# Patient Record
Sex: FEMALE | Race: BLACK OR AFRICAN AMERICAN | NOT HISPANIC OR LATINO | Employment: FULL TIME | URBAN - METROPOLITAN AREA
[De-identification: names, ages, dates, MRNs, and addresses within clinical notes are randomized per-mention and may not be internally consistent; named-entity substitution may affect disease eponyms.]

---

## 2018-06-20 ENCOUNTER — HOSPITAL ENCOUNTER (OUTPATIENT)
Facility: HOSPITAL | Age: 18
Discharge: HOME | End: 2018-06-20
Attending: OBSTETRICS & GYNECOLOGY | Admitting: OBSTETRICS & GYNECOLOGY
Payer: MEDICAID

## 2018-06-20 VITALS
TEMPERATURE: 98 F | HEART RATE: 90 BPM | DIASTOLIC BLOOD PRESSURE: 72 MMHG | RESPIRATION RATE: 18 BRPM | SYSTOLIC BLOOD PRESSURE: 129 MMHG

## 2018-06-20 DIAGNOSIS — O47.03 THREATENED PRETERM LABOR, THIRD TRIMESTER: ICD-10-CM

## 2018-06-20 DIAGNOSIS — O30.093 TWIN GESTATION, UNABLE TO DETERMINE NUMBER OF PLACENTA AND NUMBER OF AMNIOTIC SACS IN THIRD TRIMESTER: Primary | ICD-10-CM

## 2018-06-20 PROBLEM — Z87.59 HISTORY OF PREGNANCY INDUCED HYPERTENSION: Status: ACTIVE | Noted: 2018-06-20

## 2018-06-20 PROBLEM — O09.33 NO PRENATAL CARE IN CURRENT PREGNANCY IN THIRD TRIMESTER: Status: ACTIVE | Noted: 2018-06-20

## 2018-06-20 LAB
ABO + RH BLD: NORMAL
ALBUMIN SERPL BCP-MCNC: 2.6 G/DL
ALP SERPL-CCNC: 147 U/L
ALT SERPL W/O P-5'-P-CCNC: 5 U/L
AMPHET+METHAMPHET UR QL: NEGATIVE
ANION GAP SERPL CALC-SCNC: 8 MMOL/L
AST SERPL-CCNC: 13 U/L
BARBITURATES UR QL SCN>200 NG/ML: NEGATIVE
BASOPHILS # BLD AUTO: 0.02 K/UL
BASOPHILS NFR BLD: 0.3 %
BENZODIAZ UR QL SCN>200 NG/ML: NEGATIVE
BILIRUB SERPL-MCNC: 0.3 MG/DL
BLD GP AB SCN CELLS X3 SERPL QL: NORMAL
BUN SERPL-MCNC: 4 MG/DL
BZE UR QL SCN: NEGATIVE
C TRACH DNA SPEC QL NAA+PROBE: NOT DETECTED
CALCIUM SERPL-MCNC: 8.8 MG/DL
CANNABINOIDS UR QL SCN: NEGATIVE
CHLORIDE SERPL-SCNC: 104 MMOL/L
CO2 SERPL-SCNC: 23 MMOL/L
CREAT SERPL-MCNC: 0.7 MG/DL
CREAT UR-MCNC: 123.2 MG/DL
CREAT UR-MCNC: 124.6 MG/DL
DIFFERENTIAL METHOD: ABNORMAL
EOSINOPHIL # BLD AUTO: 0.2 K/UL
EOSINOPHIL NFR BLD: 2.4 %
ERYTHROCYTE [DISTWIDTH] IN BLOOD BY AUTOMATED COUNT: 13.1 %
EST. GFR  (AFRICAN AMERICAN): >60 ML/MIN/1.73 M^2
EST. GFR  (NON AFRICAN AMERICAN): >60 ML/MIN/1.73 M^2
ESTIMATED AVG GLUCOSE: 100 MG/DL
GLUCOSE SERPL-MCNC: 94 MG/DL
HBA1C MFR BLD HPLC: 5.1 %
HCT VFR BLD AUTO: 29.3 %
HGB BLD-MCNC: 9.6 G/DL
HGB S BLD QL SOLY: NEGATIVE
HIV1+2 IGG SERPL QL IA.RAPID: NEGATIVE
LYMPHOCYTES # BLD AUTO: 1.6 K/UL
LYMPHOCYTES NFR BLD: 21.8 %
MCH RBC QN AUTO: 25.2 PG
MCHC RBC AUTO-ENTMCNC: 32.8 G/DL
MCV RBC AUTO: 77 FL
METHADONE UR QL SCN>300 NG/ML: NEGATIVE
MONOCYTES # BLD AUTO: 0.6 K/UL
MONOCYTES NFR BLD: 8.2 %
N GONORRHOEA DNA SPEC QL NAA+PROBE: NOT DETECTED
NEUTROPHILS # BLD AUTO: 5 K/UL
NEUTROPHILS NFR BLD: 67.3 %
OPIATES UR QL SCN: NEGATIVE
PCP UR QL SCN>25 NG/ML: NEGATIVE
PLATELET # BLD AUTO: 229 K/UL
PMV BLD AUTO: 9.9 FL
POTASSIUM SERPL-SCNC: 4 MMOL/L
PROT SERPL-MCNC: 6.2 G/DL
PROT UR-MCNC: 20 MG/DL
PROT/CREAT RATIO, UR: 0.16
RBC # BLD AUTO: 3.81 M/UL
RPR SER QL: NORMAL
SODIUM SERPL-SCNC: 135 MMOL/L
TOXICOLOGY INFORMATION: NORMAL
WBC # BLD AUTO: 7.35 K/UL

## 2018-06-20 PROCEDURE — 83036 HEMOGLOBIN GLYCOSYLATED A1C: CPT

## 2018-06-20 PROCEDURE — 85660 RBC SICKLE CELL TEST: CPT

## 2018-06-20 PROCEDURE — 80307 DRUG TEST PRSMV CHEM ANLYZR: CPT

## 2018-06-20 PROCEDURE — 86762 RUBELLA ANTIBODY: CPT

## 2018-06-20 PROCEDURE — 86901 BLOOD TYPING SEROLOGIC RH(D): CPT

## 2018-06-20 PROCEDURE — 86703 HIV-1/HIV-2 1 RESULT ANTBDY: CPT

## 2018-06-20 PROCEDURE — 99211 OFF/OP EST MAY X REQ PHY/QHP: CPT | Mod: 25,TH

## 2018-06-20 PROCEDURE — 84156 ASSAY OF PROTEIN URINE: CPT

## 2018-06-20 PROCEDURE — 86592 SYPHILIS TEST NON-TREP QUAL: CPT

## 2018-06-20 PROCEDURE — 87147 CULTURE TYPE IMMUNOLOGIC: CPT

## 2018-06-20 PROCEDURE — 59025 FETAL NON-STRESS TEST: CPT | Mod: 26,,, | Performed by: ADVANCED PRACTICE MIDWIFE

## 2018-06-20 PROCEDURE — 87340 HEPATITIS B SURFACE AG IA: CPT

## 2018-06-20 PROCEDURE — 87491 CHLMYD TRACH DNA AMP PROBE: CPT

## 2018-06-20 PROCEDURE — 59025 FETAL NON-STRESS TEST: CPT

## 2018-06-20 PROCEDURE — 80053 COMPREHEN METABOLIC PANEL: CPT

## 2018-06-20 PROCEDURE — 87081 CULTURE SCREEN ONLY: CPT

## 2018-06-20 PROCEDURE — 85025 COMPLETE CBC W/AUTO DIFF WBC: CPT

## 2018-06-20 PROCEDURE — 87184 SC STD DISK METHOD PER PLATE: CPT

## 2018-06-20 RX ORDER — SODIUM CHLORIDE, SODIUM LACTATE, POTASSIUM CHLORIDE, CALCIUM CHLORIDE 600; 310; 30; 20 MG/100ML; MG/100ML; MG/100ML; MG/100ML
INJECTION, SOLUTION INTRAVENOUS CONTINUOUS
Status: DISCONTINUED | OUTPATIENT
Start: 2018-06-20 | End: 2018-06-20

## 2018-06-20 RX ORDER — ONDANSETRON 8 MG/1
8 TABLET, ORALLY DISINTEGRATING ORAL EVERY 8 HOURS PRN
Status: DISCONTINUED | OUTPATIENT
Start: 2018-06-20 | End: 2018-06-20 | Stop reason: HOSPADM

## 2018-06-20 RX ORDER — ACETAMINOPHEN 500 MG
500 TABLET ORAL EVERY 6 HOURS PRN
Status: DISCONTINUED | OUTPATIENT
Start: 2018-06-20 | End: 2018-06-20 | Stop reason: HOSPADM

## 2018-06-20 NOTE — HPI
No prenatal care, twin pregnancy  Reports contractions for the last 1 day, currently reporting every 5-10 minutes.   Good fetal movement

## 2018-06-20 NOTE — DISCHARGE SUMMARY
[facility] -   Obstetrics  Discharge Summary      Patient Name: Kumar Mccoy  MRN: [id number]  Admission Date: 2018  Hospital Length of Stay: 0 days  Discharge Date and Time:  2018 4:08 PM  Attending Physician: Len Rivas MD   Discharging Provider: Johanna Carcamo CNM  Primary Care Provider: Primary Doctor No    HPI: No prenatal care, twin pregnancy  Reports contractions for the last 1 day, currently reporting every 5-10 minutes.   Good fetal movement    * No surgery found *     Hospital Course:   Admit for observation, new OB labs, US, G/C, GBS collected, UA, UDS, PCR        Final Active Diagnoses:    Diagnosis Date Noted POA    History of pregnancy induced hypertension [Z87.59] 2018 Not Applicable    Twin gestation, unable to determine number of placenta and number of amniotic sacs in third trimester [O30.093] 2018 Unknown    No prenatal care in current pregnancy in third trimester [O09.33] 2018 Not Applicable      Problems Resolved During this Admission:    Diagnosis Date Noted Date Resolved POA    PRINCIPAL PROBLEM:  Threatened  labor, third trimester [O47.03] 2018 Yes        Labs: All labs within the past 24 hours have been reviewed      Immunizations     None          This patient has no babies on file.  Pending Diagnostic Studies:     Procedure Component Value Units Date/Time    Hemoglobin A1c [[id number]] Collected:  18    Order Status:  Sent Lab Status:  In process Updated:  18    Specimen:  Blood from Blood     Hepatitis B Surface Antigen [[id number]] Collected:  18    Order Status:  Sent Lab Status:  In process Updated:  18    Specimen:  Blood from Blood     Rubella antibody, IgG [[id number]] Collected:  18    Order Status:  Sent Lab Status:  In process Updated:  18    Specimen:  Blood from Blood     US OB More Than 14 Wks Add Gestation [[id number]] Updated:  18  1256    Order Status:  Sent Lab Status:  In process           Discharged Condition: good    Disposition: Home or Self Care    Follow Up:  Follow-up Information     Leigh Geronimo MD On 6/28/2018.    Specialties:  Obstetrics, Obstetrics and Gynecology  Why:  10:15 for US, 11:30 for appt with Dr. Geronimo  Contact information:  [address]  [place] [address]  [phone]                 Patient Instructions:     Notify your health care provider if you experience any of the following:  temperature >100.4     Notify your health care provider if you experience any of the following:  persistent nausea and vomiting or diarrhea     Notify your health care provider if you experience any of the following:  severe uncontrolled pain     Notify your health care provider if you experience any of the following:  redness, tenderness, or signs of infection (pain, swelling, redness, odor or green/yellow discharge around incision site)     Notify your health care provider if you experience any of the following:  difficulty breathing or increased cough       Medications:  There are no discharge medications for this patient.      Johanna Carcamo CNM  Obstetrics  [facility]

## 2018-06-20 NOTE — H&P
[facility] -   Obstetrics  History & Physical    Patient Name: Kumar Mccoy  MRN: [id number]  Admission Date: 2018  Primary Care Provider: Primary Doctor No    Subjective:     Principal Problem:Threatened  labor, third trimester    History of Present Illness:  No prenatal care, twin pregnancy  Reports contractions for the last 1 day, currently reporting every 5-10 minutes.   Good fetal movement    Obstetric HPI:  Patient reports Date/time of onset:  and Frequency: Every 5-10 minutes contractions, active fetal movement, No vaginal bleeding , No loss of fluid     This pregnancy has been complicated by no prenatal care, twin pregnancy, PIH  Induction with last baby.     Obstetric History       T1      L1     SAB0   TAB0   Ectopic0   Multiple0   Live Births1       # Outcome Date GA Lbr Peyman/2nd Weight Sex Delivery Anes PTL Lv   2 Current            1 Term 16 39w5d  3.005 kg (6 lb 10 oz) M Vag-Spont EPI N HARSHAL      Complications: PIH (pregnancy induced hypertension)        History reviewed. No pertinent past medical history.  History reviewed. No pertinent surgical history.    No prescriptions prior to admission.       Review of patient's allergies indicates:  No Known Allergies     Family History     None        Social History Main Topics    Smoking status: Never Smoker    Smokeless tobacco: Never Used    Alcohol use No    Drug use: Unknown    Sexual activity: Yes     Review of Systems   Constitutional: Negative.    Eyes: Negative.    Cardiovascular: Negative.    Gastrointestinal: Positive for abdominal pain.   Genitourinary: Negative.    Musculoskeletal: Negative.    Skin:  Negative.   Neurological: Positive for headaches.   Psychiatric/Behavioral: Negative.       Objective:     Vital Signs (Most Recent):    Vital Signs (24h Range):           There is no height or weight on file to calculate BMI.    FHT: 150 Cat 1 (reassuring)  FHT: 150 Cat 1 (reassuring)  TOCO:  Reports every 5-10 minutes, appears to be irritability on toco.    Physical Exam:   Constitutional: She is oriented to person, place, and time. She appears well-developed and well-nourished. No distress.    HENT:   Head: Normocephalic and atraumatic.      Cardiovascular: Normal rate, regular rhythm and normal heart sounds.     Pulmonary/Chest: Effort normal and breath sounds normal.        Abdominal: Soft. Bowel sounds are normal.     Genitourinary: Vagina normal.           Musculoskeletal: Normal range of motion and moves all extremeties.       Neurological: She is alert and oriented to person, place, and time.    Skin: Skin is warm and dry.    Psychiatric: She has a normal mood and affect. Her behavior is normal. Judgment and thought content normal.       Cervix:  Dilation:  1  Effacement:  60  Station: -3  Presentation: Vertex     Significant Labs:  No results found for: GROUPTRH, HEPBSAG, RUBELLAIGGSC, STREPBCULT, AFP, [id number]    I have personallly reviewed all pertinent lab results from the last 24 hours.    Assessment/Plan:     18 y.o. female  at 33w3d for:    * Threatened  labor, third trimester    NST, SVE        No prenatal care in current pregnancy in third trimester    New OB labs, US        History of pregnancy induced hypertension    Baseline PreE labs today            Johanna Carcamo CNM  Obstetrics  [facility]

## 2018-06-20 NOTE — PROCEDURES
Kumar Mccoy is a 18 y.o. female patient.    Temp: 98.1 °F (36.7 °C) (06/20/18 1500)  Pulse: 90 (06/20/18 1418)  Resp: 18 (06/20/18 1500)  BP: 129/72 (06/20/18 1418)       Obtain Fetal nonstress test (NST)  Date/Time: 6/20/2018 4:08 PM  Performed by: JOHANNA GARCIA  Authorized by: JOHANNA GARCIA     Nonstress Test:     Variability:  6-25 BPM    Decelerations:  None    Accelerations:  15 bpm    Baseline:  150    Uterine Irritability: No      Contractions:  Not present    Contraction Frequency:  None  Biophysical Profile:     Nonstress Test Interpretation: reactive      Overall Impression:  Reassuring  Post-procedure:     Patient tolerance:  Patient tolerated the procedure well with no immediate complications        Johanna Garcia  6/20/2018

## 2018-06-20 NOTE — SUBJECTIVE & OBJECTIVE
Obstetric HPI:  Patient reports Date/time of onset:  and Frequency: Every 5-10 minutes contractions, active fetal movement, No vaginal bleeding , No loss of fluid     This pregnancy has been complicated by no prenatal care, twin pregnancy, PIH  Induction with last baby.     Obstetric History       T1      L1     SAB0   TAB0   Ectopic0   Multiple0   Live Births1       # Outcome Date GA Lbr Peyman/2nd Weight Sex Delivery Anes PTL Lv   2 Current            1 Term 16 39w5d  3.005 kg (6 lb 10 oz) M Vag-Spont EPI N HARSHAL      Complications: PIH (pregnancy induced hypertension)        History reviewed. No pertinent past medical history.  History reviewed. No pertinent surgical history.    No prescriptions prior to admission.       Review of patient's allergies indicates:  No Known Allergies     Family History     None        Social History Main Topics    Smoking status: Never Smoker    Smokeless tobacco: Never Used    Alcohol use No    Drug use: Unknown    Sexual activity: Yes     Review of Systems   Constitutional: Negative.    Eyes: Negative.    Cardiovascular: Negative.    Gastrointestinal: Positive for abdominal pain.   Genitourinary: Negative.    Musculoskeletal: Negative.    Skin:  Negative.   Neurological: Positive for headaches.   Psychiatric/Behavioral: Negative.       Objective:     Vital Signs (Most Recent):    Vital Signs (24h Range):           There is no height or weight on file to calculate BMI.    FHT: 150 Cat 1 (reassuring)  FHT: 150 Cat 1 (reassuring)  TOCO: Reports every 5-10 minutes, appears to be irritability on toco.    Physical Exam:   Constitutional: She is oriented to person, place, and time. She appears well-developed and well-nourished. No distress.    HENT:   Head: Normocephalic and atraumatic.      Cardiovascular: Normal rate, regular rhythm and normal heart sounds.     Pulmonary/Chest: Effort normal and breath sounds normal.        Abdominal: Soft. Bowel sounds are  normal.     Genitourinary: Vagina normal.           Musculoskeletal: Normal range of motion and moves all extremeties.       Neurological: She is alert and oriented to person, place, and time.    Skin: Skin is warm and dry.    Psychiatric: She has a normal mood and affect. Her behavior is normal. Judgment and thought content normal.       Cervix:  Dilation:  1  Effacement:  60  Station: -3  Presentation: Vertex     Significant Labs:  No results found for: GROUPTRH, HEPBSAG, RUBELLAIGGSC, STREPBCULT, AFP, [id number]    I have personallly reviewed all pertinent lab results from the last 24 hours.

## 2018-06-20 NOTE — DISCHARGE INSTRUCTIONS

## 2018-06-21 LAB
HBV SURFACE AG SERPL QL IA: NEGATIVE
RUBV IGG SER-ACNC: 10.5 IU/ML
RUBV IGG SER-IMP: REACTIVE

## 2018-06-25 LAB — BACTERIA SPEC AEROBE CULT: NORMAL

## 2018-06-28 ENCOUNTER — PROCEDURE VISIT (OUTPATIENT)
Dept: OBSTETRICS AND GYNECOLOGY | Facility: CLINIC | Age: 18
End: 2018-06-28
Payer: MEDICAID

## 2018-06-28 ENCOUNTER — ROUTINE PRENATAL (OUTPATIENT)
Dept: OBSTETRICS AND GYNECOLOGY | Facility: CLINIC | Age: 18
End: 2018-06-28
Payer: MEDICAID

## 2018-06-28 VITALS — SYSTOLIC BLOOD PRESSURE: 122 MMHG | DIASTOLIC BLOOD PRESSURE: 62 MMHG | WEIGHT: 213.19 LBS

## 2018-06-28 DIAGNOSIS — O30.093 TWIN GESTATION, UNABLE TO DETERMINE NUMBER OF PLACENTA AND NUMBER OF AMNIOTIC SACS IN THIRD TRIMESTER: ICD-10-CM

## 2018-06-28 DIAGNOSIS — O30.033 MONOCHORIONIC DIAMNIOTIC TWIN PREGNANCY, THIRD TRIMESTER: Primary | ICD-10-CM

## 2018-06-28 DIAGNOSIS — O09.30 INSUFFICIENT PRENATAL CARE, UNSPECIFIED TRIMESTER: ICD-10-CM

## 2018-06-28 DIAGNOSIS — Z3A.34 PREGNANCY WITH 34 COMPLETED WEEKS GESTATION: ICD-10-CM

## 2018-06-28 PROCEDURE — 76805 OB US >/= 14 WKS SNGL FETUS: CPT | Mod: 59,PBBFAC,PO | Performed by: OBSTETRICS & GYNECOLOGY

## 2018-06-28 PROCEDURE — 76810 OB US >/= 14 WKS ADDL FETUS: CPT | Mod: 26,59,S$PBB, | Performed by: OBSTETRICS & GYNECOLOGY

## 2018-06-28 PROCEDURE — 76810 OB US >/= 14 WKS ADDL FETUS: CPT | Mod: 59,PBBFAC,PO | Performed by: OBSTETRICS & GYNECOLOGY

## 2018-06-28 PROCEDURE — 76805 OB US >/= 14 WKS SNGL FETUS: CPT | Mod: 26,59,S$PBB, | Performed by: OBSTETRICS & GYNECOLOGY

## 2018-06-28 PROCEDURE — 99212 OFFICE O/P EST SF 10 MIN: CPT | Mod: TH,S$PBB,, | Performed by: OBSTETRICS & GYNECOLOGY

## 2018-06-28 PROCEDURE — 99212 OFFICE O/P EST SF 10 MIN: CPT | Mod: PBBFAC,TH,PO,25 | Performed by: OBSTETRICS & GYNECOLOGY

## 2018-06-28 PROCEDURE — 99999 PR PBB SHADOW E&M-EST. PATIENT-LVL II: CPT | Mod: PBBFAC,,, | Performed by: OBSTETRICS & GYNECOLOGY

## 2018-06-28 NOTE — PROGRESS NOTES
Ultrasound: vertex/breech, 1.7% discordance, most of anatomy normal, some subopt views due to late gestation. Will plan for delivery 37-37w6d if no complications. Discussed option for  trial vs scheduled cs

## 2018-07-10 ENCOUNTER — TELEPHONE (OUTPATIENT)
Dept: OBSTETRICS AND GYNECOLOGY | Facility: CLINIC | Age: 18
End: 2018-07-10

## 2018-07-10 ENCOUNTER — HOSPITAL ENCOUNTER (OUTPATIENT)
Facility: HOSPITAL | Age: 18
Discharge: HOME | End: 2018-07-10
Attending: OBSTETRICS & GYNECOLOGY | Admitting: OBSTETRICS & GYNECOLOGY
Payer: MEDICAID

## 2018-07-10 VITALS
RESPIRATION RATE: 18 BRPM | HEART RATE: 94 BPM | TEMPERATURE: 99 F | DIASTOLIC BLOOD PRESSURE: 72 MMHG | SYSTOLIC BLOOD PRESSURE: 125 MMHG

## 2018-07-10 DIAGNOSIS — O47.03 THREATENED PREMATURE LABOR IN THIRD TRIMESTER: ICD-10-CM

## 2018-07-10 DIAGNOSIS — O47.03 THREATENED PRETERM LABOR, THIRD TRIMESTER: ICD-10-CM

## 2018-07-10 PROCEDURE — 96374 THER/PROPH/DIAG INJ IV PUSH: CPT

## 2018-07-10 PROCEDURE — 99211 OFF/OP EST MAY X REQ PHY/QHP: CPT | Mod: 25,TH

## 2018-07-10 PROCEDURE — 96360 HYDRATION IV INFUSION INIT: CPT

## 2018-07-10 PROCEDURE — 63600175 PHARM REV CODE 636 W HCPCS: Performed by: ADVANCED PRACTICE MIDWIFE

## 2018-07-10 PROCEDURE — 25000003 PHARM REV CODE 250: Performed by: ADVANCED PRACTICE MIDWIFE

## 2018-07-10 PROCEDURE — 99213 OFFICE O/P EST LOW 20 MIN: CPT | Mod: TH,25,, | Performed by: ADVANCED PRACTICE MIDWIFE

## 2018-07-10 PROCEDURE — 96361 HYDRATE IV INFUSION ADD-ON: CPT

## 2018-07-10 PROCEDURE — 59025 FETAL NON-STRESS TEST: CPT | Mod: 26,,, | Performed by: ADVANCED PRACTICE MIDWIFE

## 2018-07-10 PROCEDURE — 59025 FETAL NON-STRESS TEST: CPT

## 2018-07-10 RX ORDER — ACETAMINOPHEN 325 MG/1
650 TABLET ORAL EVERY 6 HOURS PRN
Status: DISCONTINUED | OUTPATIENT
Start: 2018-07-10 | End: 2018-07-10 | Stop reason: HOSPADM

## 2018-07-10 RX ORDER — BUTORPHANOL TARTRATE 1 MG/ML
1 INJECTION INTRAMUSCULAR; INTRAVENOUS ONCE
Status: COMPLETED | OUTPATIENT
Start: 2018-07-10 | End: 2018-07-10

## 2018-07-10 RX ORDER — ACETAMINOPHEN 500 MG
500 TABLET ORAL EVERY 6 HOURS PRN
Status: DISCONTINUED | OUTPATIENT
Start: 2018-07-10 | End: 2018-07-10

## 2018-07-10 RX ORDER — ONDANSETRON 8 MG/1
8 TABLET, ORALLY DISINTEGRATING ORAL EVERY 8 HOURS PRN
Status: DISCONTINUED | OUTPATIENT
Start: 2018-07-10 | End: 2018-07-10 | Stop reason: HOSPADM

## 2018-07-10 RX ADMIN — BUTORPHANOL TARTRATE 1 MG: 1 INJECTION, SOLUTION INTRAMUSCULAR; INTRAVENOUS at 02:07

## 2018-07-10 RX ADMIN — SODIUM CHLORIDE, SODIUM LACTATE, POTASSIUM CHLORIDE, AND CALCIUM CHLORIDE 1000 ML: .6; .31; .03; .02 INJECTION, SOLUTION INTRAVENOUS at 02:07

## 2018-07-10 NOTE — DISCHARGE SUMMARY
"[facility] -   Obstetrics  Discharge Summary      Patient Name: Kumar Mccoy  MRN: [id number]  Admission Date: 7/10/2018  Hospital Length of Stay: 0 days  Discharge Date and Time:  07/10/2018 4:25 PM  Attending Physician: Torie Carcamo MD   Discharging Provider: Johanna Carcamo CNM  Primary Care Provider: Primary Doctor No    HPI: Reports contractions X 2 days, pain 9/10, good fetal movement, no leaking or bleeding    * No surgery found *     Hospital Course:   Pt still having contractions, but "not as bad as before"  No cervical change, reactive NST.   Lives 5 minutes away, will come back if contractions get worse.        Final Active Diagnoses:    Diagnosis Date Noted POA    PRINCIPAL PROBLEM:  Threatened premature labor in third trimester [O47.03] 07/10/2018 Unknown      Problems Resolved During this Admission:    Diagnosis Date Noted Date Resolved POA    Threatened  labor, third trimester [O47.03] 07/10/2018 07/10/2018 Yes        Immunizations     None          This patient has no babies on file.  Pending Diagnostic Studies:     None          Discharged Condition: good    Disposition: Home or Self Care    Follow Up:  Follow-up Information     Follow up On 2018.               Patient Instructions:     Notify your health care provider if you experience any of the following:  temperature >100.4     Notify your health care provider if you experience any of the following:  persistent nausea and vomiting or diarrhea     Notify your health care provider if you experience any of the following:  severe uncontrolled pain     Notify your health care provider if you experience any of the following:  redness, tenderness, or signs of infection (pain, swelling, redness, odor or green/yellow discharge around incision site)     Notify your health care provider if you experience any of the following:  difficulty breathing or increased cough     Notify your health care provider if you experience any of " the following:  severe persistent headache       Medications:  Current Discharge Medication List      STOP taking these medications       diphenhydramine-acetaminophen (TYLENOL PM)  mg Tab Comments:   Reason for Stopping:               Johanna Carcamo CNM  Obstetrics  [facility]

## 2018-07-10 NOTE — HOSPITAL COURSE
"Pt still having contractions, but "not as bad as before"  No cervical change, reactive NST.   Lives 5 minutes away, will come back if contractions get worse.  "

## 2018-07-10 NOTE — PROCEDURES
Kumar Mccoy is a 18 y.o. female patient.    Temp: 99.2 °F (37.3 °C) (07/10/18 1245)  Pulse: 94 (07/10/18 1245)  Resp: 18 (07/10/18 1245)  BP: 125/72 (07/10/18 1245)       Fetal non-stress test  Date/Time: 7/10/2018 5:07 PM  Performed by: JOHANNA GARCIA  Authorized by: JOHANNA GARCIA     Nonstress Test:     Variability:  6-25 BPM    Decelerations:  None    Accelerations:  15 bpm    Baseline:  135    Uterine Irritability: Yes      Contractions:  Regular    Contraction Frequency:  2-4  Biophysical Profile:     Nonstress Test Interpretation: reactive      Overall Impression:  Reassuring  Post-procedure:     Patient tolerance:  Patient tolerated the procedure well with no immediate complications        Johanna Garcia  7/10/2018

## 2018-07-10 NOTE — SUBJECTIVE & OBJECTIVE
Obstetric HPI:  Patient reports Date/time of onset: 2018, Frequency: Every 2-3 minutes, , active fetal movement, No vaginal bleeding , No loss of fluid     This pregnancy has been complicated by limited prenatal care (1 visit) and mono di twins    Obstetric History       T1      L1     SAB0   TAB0   Ectopic0   Multiple0   Live Births1       # Outcome Date GA Lbr Peyman/2nd Weight Sex Delivery Anes PTL Lv   2 Current            1 Term 16 39w5d  3.005 kg (6 lb 10 oz) M Vag-Spont EPI N HARSHAL      Complications: PIH (pregnancy induced hypertension)        Past Medical History:   Diagnosis Date    Anemia     Hypertension      Past Surgical History:   Procedure Laterality Date    I&D LEFT ARM      TONSILLECTOMY, ADENOIDECTOMY, BILATERAL MYRINGOTOMY AND TUBES         PTA Medications   Medication Sig    [DISCONTINUED] diphenhydramine-acetaminophen (TYLENOL PM)  mg Tab Take 1 tablet by mouth nightly as needed.       Review of patient's allergies indicates:  No Known Allergies     Family History     Problem Relation (Age of Onset)    Cancer Maternal Grandmother    Diabetes Maternal Grandfather        Social History Main Topics    Smoking status: Never Smoker    Smokeless tobacco: Never Used    Alcohol use No    Drug use: No    Sexual activity: Yes     Partners: Male     Review of Systems   Constitutional: Negative.    Respiratory: Negative.    Cardiovascular: Negative.    Gastrointestinal: Positive for abdominal pain.   Genitourinary: Negative.    Musculoskeletal: Positive for back pain.   Skin:  Negative.   Neurological: Negative.    Psychiatric/Behavioral: Negative.       Objective:     Vital Signs (Most Recent):  Temp: 99.2 °F (37.3 °C) (07/10/18 1245)  Pulse: 94 (07/10/18 1245)  Resp: 18 (07/10/18 1245)  BP: 125/72 (07/10/18 1245) Vital Signs (24h Range):  Temp:  [99.2 °F (37.3 °C)] 99.2 °F (37.3 °C)  Pulse:  [94] 94  Resp:  [18] 18  BP: (125)/(72) 125/72        There is no height or  weight on file to calculate BMI.    FHT: 130 Cat 1 (reassuring)  FHT: 135 Cat 1 (reassuring)  TOCO: Q 2-5 minutes, mild by palpation    Physical Exam    Cervix:  Dilation:  2  Effacement:  50%  Station: -2  Presentation: Vertex     Significant Labs:  Lab Results   Component Value Date    GROUPTRH O POS 06/20/2018    HEPBSAG Negative 06/20/2018    STREPBCULT  06/20/2018     STREPTOCOCCUS AGALACTIAE (GROUP B)  Beta-hemolytic streptococci are routinely susceptible to   penicillins,cephalosporins and carbapenems.         I have personallly reviewed all pertinent lab results from the last 24 hours.

## 2018-07-10 NOTE — PROCEDURES
Kumar Mccoy is a 18 y.o. female patient.    Temp: 99.2 °F (37.3 °C) (07/10/18 1245)  Pulse: 94 (07/10/18 1245)  Resp: 18 (07/10/18 1245)  BP: 125/72 (07/10/18 1245)       Procedures    Johanna Carcamo  7/10/2018

## 2018-07-10 NOTE — PROCEDURES
Kumar Mccoy is a 18 y.o. female patient.    Temp: 99.2 °F (37.3 °C) (07/10/18 1245)  Pulse: 94 (07/10/18 1245)  Resp: 18 (07/10/18 1245)  BP: 125/72 (07/10/18 1245)       Fetal non-stress test  Date/Time: 7/10/2018 4:18 PM  Performed by: JOHANNA GARCIA  Authorized by: JOHANNA GARCIA     Nonstress Test:     Variability:  6-25 BPM    Decelerations:  None    Accelerations:  15 bpm    Baseline:  135    Uterine Irritability: Yes      Contractions:  Regular    Contraction Frequency:  3-5  Biophysical Profile:     Nonstress Test Interpretation: reactive      Overall Impression:  Reassuring  Post-procedure:     Patient tolerance:  Patient tolerated the procedure well with no immediate complications          Johanna Garcia  7/10/2018

## 2018-07-10 NOTE — TELEPHONE ENCOUNTER
Patient called stated she hasn't been able to move or get out of the bed in 2 days and she is in severe pain. Advised the patient to go to [facility] on [address] for evaluation. Patient states she is on her way and should be there in about 5 minutes.

## 2018-07-10 NOTE — DISCHARGE INSTRUCTIONS

## 2018-07-10 NOTE — H&P
[facility] -   Obstetrics  History & Physical    Patient Name: Kumar Mccoy  MRN: [id number]  Admission Date: 7/10/2018  Primary Care Provider: Primary Doctor No    Subjective:     Principal Problem:Threatened premature labor in third trimester    History of Present Illness:  Reports contractions X 2 days, pain 9/10, good fetal movement, no leaking or bleeding    Obstetric HPI:  Patient reports Date/time of onset: 2018, Frequency: Every 2-3 minutes, , active fetal movement, No vaginal bleeding , No loss of fluid     This pregnancy has been complicated by limited prenatal care (1 visit) and mono di twins    Obstetric History       T1      L1     SAB0   TAB0   Ectopic0   Multiple0   Live Births1       # Outcome Date GA Lbr Peyman/2nd Weight Sex Delivery Anes PTL Lv   2 Current            1 Term 16 39w5d  3.005 kg (6 lb 10 oz) M Vag-Spont EPI N HARSHAL      Complications: PIH (pregnancy induced hypertension)        Past Medical History:   Diagnosis Date    Anemia     Hypertension      Past Surgical History:   Procedure Laterality Date    I&D LEFT ARM      TONSILLECTOMY, ADENOIDECTOMY, BILATERAL MYRINGOTOMY AND TUBES         PTA Medications   Medication Sig    [DISCONTINUED] diphenhydramine-acetaminophen (TYLENOL PM)  mg Tab Take 1 tablet by mouth nightly as needed.       Review of patient's allergies indicates:  No Known Allergies     Family History     Problem Relation (Age of Onset)    Cancer Maternal Grandmother    Diabetes Maternal Grandfather        Social History Main Topics    Smoking status: Never Smoker    Smokeless tobacco: Never Used    Alcohol use No    Drug use: No    Sexual activity: Yes     Partners: Male     Review of Systems   Constitutional: Negative.    Respiratory: Negative.    Cardiovascular: Negative.    Gastrointestinal: Positive for abdominal pain.   Genitourinary: Negative.    Musculoskeletal: Positive for back pain.   Skin:  Negative.    Neurological: Negative.    Psychiatric/Behavioral: Negative.       Objective:     Vital Signs (Most Recent):  Temp: 99.2 °F (37.3 °C) (07/10/18 1245)  Pulse: 94 (07/10/18 1245)  Resp: 18 (07/10/18 1245)  BP: 125/72 (07/10/18 1245) Vital Signs (24h Range):  Temp:  [99.2 °F (37.3 °C)] 99.2 °F (37.3 °C)  Pulse:  [94] 94  Resp:  [18] 18  BP: (125)/(72) 125/72        There is no height or weight on file to calculate BMI.    FHT: 130 Cat 1 (reassuring)  FHT: 135 Cat 1 (reassuring)  TOCO: Q 2-5 minutes, mild by palpation    Physical Exam    Cervix:  Dilation:  2  Effacement:  50%  Station: -2  Presentation: Vertex     Significant Labs:  Lab Results   Component Value Date    GROUPTRH O POS 2018    HEPBSAG Negative 2018    STREPBCULT  2018     STREPTOCOCCUS AGALACTIAE (GROUP B)  Beta-hemolytic streptococci are routinely susceptible to   penicillins,cephalosporins and carbapenems.         I have personallly reviewed all pertinent lab results from the last 24 hours.    Assessment/Plan:     18 y.o. female  at 36w2d for:    * Threatened premature labor in third trimester    Observation, NST  SVE, IV hydration, pain meds            Johanna Carcamo CNM  Obstetrics  [facility]

## 2018-07-12 ENCOUNTER — ROUTINE PRENATAL (OUTPATIENT)
Dept: OBSTETRICS AND GYNECOLOGY | Facility: CLINIC | Age: 18
End: 2018-07-12
Payer: MEDICAID

## 2018-07-12 VITALS — SYSTOLIC BLOOD PRESSURE: 126 MMHG | WEIGHT: 215.63 LBS | DIASTOLIC BLOOD PRESSURE: 74 MMHG

## 2018-07-12 DIAGNOSIS — O30.033 MONOCHORIONIC DIAMNIOTIC TWIN GESTATION IN THIRD TRIMESTER: ICD-10-CM

## 2018-07-12 DIAGNOSIS — Z3A.36 PREGNANCY WITH 36 COMPLETED WEEKS GESTATION: Primary | ICD-10-CM

## 2018-07-12 DIAGNOSIS — O09.30 INSUFFICIENT PRENATAL CARE, UNSPECIFIED TRIMESTER: ICD-10-CM

## 2018-07-12 PROCEDURE — 99212 OFFICE O/P EST SF 10 MIN: CPT | Mod: PBBFAC,TH,PO | Performed by: OBSTETRICS & GYNECOLOGY

## 2018-07-12 PROCEDURE — 99999 PR PBB SHADOW E&M-EST. PATIENT-LVL II: CPT | Mod: PBBFAC,,, | Performed by: OBSTETRICS & GYNECOLOGY

## 2018-07-12 PROCEDURE — 99212 OFFICE O/P EST SF 10 MIN: CPT | Mod: TH,S$PBB,, | Performed by: OBSTETRICS & GYNECOLOGY

## 2018-07-15 NOTE — PROGRESS NOTES
Pt seen in L&D, cervix 2cm but no active labor. C/o constant pelvic pressure & pain. Cervix with minimal cervical changes, vertex, engaged. Labor precautions. Previous , candidate for  of twins-vertex/breech last scan. RTC 2 wks, u/s growth twins in 1 wk

## 2018-07-19 ENCOUNTER — ANESTHESIA (OUTPATIENT)
Dept: OBSTETRICS AND GYNECOLOGY | Facility: HOSPITAL | Age: 18
End: 2018-07-19
Payer: MEDICAID

## 2018-07-19 ENCOUNTER — ANESTHESIA EVENT (OUTPATIENT)
Dept: OBSTETRICS AND GYNECOLOGY | Facility: HOSPITAL | Age: 18
End: 2018-07-19
Payer: MEDICAID

## 2018-07-19 ENCOUNTER — HOSPITAL ENCOUNTER (INPATIENT)
Facility: HOSPITAL | Age: 18
LOS: 3 days | Discharge: HOME | End: 2018-07-22
Attending: OBSTETRICS & GYNECOLOGY | Admitting: OBSTETRICS & GYNECOLOGY
Payer: MEDICAID

## 2018-07-19 ENCOUNTER — ROUTINE PRENATAL (OUTPATIENT)
Dept: OBSTETRICS AND GYNECOLOGY | Facility: CLINIC | Age: 18
End: 2018-07-19
Payer: MEDICAID

## 2018-07-19 ENCOUNTER — SURGERY (OUTPATIENT)
Age: 18
End: 2018-07-19

## 2018-07-19 ENCOUNTER — PROCEDURE VISIT (OUTPATIENT)
Dept: OBSTETRICS AND GYNECOLOGY | Facility: CLINIC | Age: 18
End: 2018-07-19
Payer: MEDICAID

## 2018-07-19 VITALS — DIASTOLIC BLOOD PRESSURE: 82 MMHG | WEIGHT: 219.38 LBS | SYSTOLIC BLOOD PRESSURE: 132 MMHG

## 2018-07-19 DIAGNOSIS — O36.5932 POOR FETAL GROWTH AFFECTING MANAGEMENT OF MOTHER IN THIRD TRIMESTER, FETUS 2 OF MULTIPLE GESTATION: ICD-10-CM

## 2018-07-19 DIAGNOSIS — O30.033 MONOCHORIONIC DIAMNIOTIC TWIN GESTATION IN THIRD TRIMESTER: ICD-10-CM

## 2018-07-19 DIAGNOSIS — O36.5990 IUGR (INTRAUTERINE GROWTH RESTRICTION) AFFECTING CARE OF MOTHER: ICD-10-CM

## 2018-07-19 DIAGNOSIS — Z98.891 S/P C-SECTION: ICD-10-CM

## 2018-07-19 DIAGNOSIS — O30.093 TWIN GESTATION, UNABLE TO DETERMINE NUMBER OF PLACENTA AND NUMBER OF AMNIOTIC SACS IN THIRD TRIMESTER: Primary | ICD-10-CM

## 2018-07-19 DIAGNOSIS — Z98.891 S/P PRIMARY LOW TRANSVERSE C-SECTION: Primary | ICD-10-CM

## 2018-07-19 PROBLEM — O36.5931 IUGR (INTRAUTERINE GROWTH RESTRICTION) AFFECTING CARE OF MOTHER, THIRD TRIMESTER, FETUS 1: Status: RESOLVED | Noted: 2018-07-19 | Resolved: 2018-07-19

## 2018-07-19 PROBLEM — O36.5931 IUGR (INTRAUTERINE GROWTH RESTRICTION) AFFECTING CARE OF MOTHER, THIRD TRIMESTER, FETUS 1: Status: ACTIVE | Noted: 2018-07-19

## 2018-07-19 PROBLEM — O99.820 GBS (GROUP B STREPTOCOCCUS CARRIER), +RV CULTURE, CURRENTLY PREGNANT: Status: ACTIVE | Noted: 2018-07-19

## 2018-07-19 PROBLEM — O36.5911 IUGR (INTRAUTERINE GROWTH RESTRICTION) AFFECTING CARE OF MOTHER, FIRST TRIMESTER, FETUS 1: Status: ACTIVE | Noted: 2018-07-19

## 2018-07-19 LAB
ABO + RH BLD: NORMAL
ANISOCYTOSIS BLD QL SMEAR: SLIGHT
BASOPHILS # BLD AUTO: 0.03 K/UL
BASOPHILS NFR BLD: 0.5 %
BLD GP AB SCN CELLS X3 SERPL QL: NORMAL
DACRYOCYTES BLD QL SMEAR: ABNORMAL
DIFFERENTIAL METHOD: ABNORMAL
EOSINOPHIL # BLD AUTO: 0.3 K/UL
EOSINOPHIL NFR BLD: 3.8 %
ERYTHROCYTE [DISTWIDTH] IN BLOOD BY AUTOMATED COUNT: 13.4 %
GIANT PLATELETS BLD QL SMEAR: PRESENT
HCT VFR BLD AUTO: 27.9 %
HGB BLD-MCNC: 9.2 G/DL
HIV1+2 IGG SERPL QL IA.RAPID: NEGATIVE
HYPOCHROMIA BLD QL SMEAR: ABNORMAL
LYMPHOCYTES # BLD AUTO: 1.5 K/UL
LYMPHOCYTES NFR BLD: 22.9 %
MCH RBC QN AUTO: 24.3 PG
MCHC RBC AUTO-ENTMCNC: 33 G/DL
MCV RBC AUTO: 74 FL
MONOCYTES # BLD AUTO: 0.7 K/UL
MONOCYTES NFR BLD: 11.3 %
NEUTROPHILS # BLD AUTO: 4 K/UL
NEUTROPHILS NFR BLD: 63.2 %
OVALOCYTES BLD QL SMEAR: ABNORMAL
PLATELET # BLD AUTO: 235 K/UL
PLATELET BLD QL SMEAR: ABNORMAL
PMV BLD AUTO: 10 FL
POIKILOCYTOSIS BLD QL SMEAR: SLIGHT
POLYCHROMASIA BLD QL SMEAR: ABNORMAL
RBC # BLD AUTO: 3.79 M/UL
RPR SER QL: NORMAL
SPHEROCYTES BLD QL SMEAR: ABNORMAL
WBC # BLD AUTO: 6.54 K/UL

## 2018-07-19 PROCEDURE — 72100002 HC LABOR CARE, 1ST 8 HOURS

## 2018-07-19 PROCEDURE — 11000001 HC ACUTE MED/SURG PRIVATE ROOM

## 2018-07-19 PROCEDURE — 99213 OFFICE O/P EST LOW 20 MIN: CPT | Mod: TH,S$PBB,, | Performed by: ADVANCED PRACTICE MIDWIFE

## 2018-07-19 PROCEDURE — 86592 SYPHILIS TEST NON-TREP QUAL: CPT

## 2018-07-19 PROCEDURE — 36000686 HC CESAREAN SECTION LEVEL II

## 2018-07-19 PROCEDURE — 76816 OB US FOLLOW-UP PER FETUS: CPT | Mod: 26,S$PBB,, | Performed by: OBSTETRICS & GYNECOLOGY

## 2018-07-19 PROCEDURE — 88307 TISSUE EXAM BY PATHOLOGIST: CPT | Mod: 26,,, | Performed by: PATHOLOGY

## 2018-07-19 PROCEDURE — 76820 UMBILICAL ARTERY ECHO: CPT | Mod: 26,S$PBB,, | Performed by: OBSTETRICS & GYNECOLOGY

## 2018-07-19 PROCEDURE — 59514 CESAREAN DELIVERY ONLY: CPT | Mod: 22,AT,, | Performed by: OBSTETRICS & GYNECOLOGY

## 2018-07-19 PROCEDURE — 86920 COMPATIBILITY TEST SPIN: CPT

## 2018-07-19 PROCEDURE — 63600175 PHARM REV CODE 636 W HCPCS: Performed by: NURSE ANESTHETIST, CERTIFIED REGISTERED

## 2018-07-19 PROCEDURE — 76820 UMBILICAL ARTERY ECHO: CPT | Mod: PBBFAC | Performed by: OBSTETRICS & GYNECOLOGY

## 2018-07-19 PROCEDURE — 27200688 HC TRAY, SPINAL-HYPER/ ISOBARIC: Performed by: NURSE ANESTHETIST, CERTIFIED REGISTERED

## 2018-07-19 PROCEDURE — 99212 OFFICE O/P EST SF 10 MIN: CPT | Mod: PBBFAC,TH | Performed by: ADVANCED PRACTICE MIDWIFE

## 2018-07-19 PROCEDURE — 76819 FETAL BIOPHYS PROFIL W/O NST: CPT | Mod: PBBFAC | Performed by: OBSTETRICS & GYNECOLOGY

## 2018-07-19 PROCEDURE — 25000003 PHARM REV CODE 250: Performed by: ADVANCED PRACTICE MIDWIFE

## 2018-07-19 PROCEDURE — 25000003 PHARM REV CODE 250: Performed by: OBSTETRICS & GYNECOLOGY

## 2018-07-19 PROCEDURE — 85025 COMPLETE CBC W/AUTO DIFF WBC: CPT

## 2018-07-19 PROCEDURE — 63600175 PHARM REV CODE 636 W HCPCS: Performed by: ADVANCED PRACTICE MIDWIFE

## 2018-07-19 PROCEDURE — 86762 RUBELLA ANTIBODY: CPT

## 2018-07-19 PROCEDURE — 88307 TISSUE EXAM BY PATHOLOGIST: CPT | Performed by: PATHOLOGY

## 2018-07-19 PROCEDURE — 76819 FETAL BIOPHYS PROFIL W/O NST: CPT | Mod: 26,S$PBB,, | Performed by: OBSTETRICS & GYNECOLOGY

## 2018-07-19 PROCEDURE — 63600175 PHARM REV CODE 636 W HCPCS: Performed by: OBSTETRICS & GYNECOLOGY

## 2018-07-19 PROCEDURE — 37000008 HC ANESTHESIA 1ST 15 MINUTES: Performed by: OBSTETRICS & GYNECOLOGY

## 2018-07-19 PROCEDURE — 76816 OB US FOLLOW-UP PER FETUS: CPT | Mod: PBBFAC | Performed by: OBSTETRICS & GYNECOLOGY

## 2018-07-19 PROCEDURE — 86703 HIV-1/HIV-2 1 RESULT ANTBDY: CPT

## 2018-07-19 PROCEDURE — 37000009 HC ANESTHESIA EA ADD 15 MINS: Performed by: OBSTETRICS & GYNECOLOGY

## 2018-07-19 PROCEDURE — 62322 NJX INTERLAMINAR LMBR/SAC: CPT | Performed by: ANESTHESIOLOGY

## 2018-07-19 PROCEDURE — 25000003 PHARM REV CODE 250: Performed by: NURSE ANESTHETIST, CERTIFIED REGISTERED

## 2018-07-19 PROCEDURE — 86850 RBC ANTIBODY SCREEN: CPT

## 2018-07-19 PROCEDURE — 99999 PR PBB SHADOW E&M-EST. PATIENT-LVL II: CPT | Mod: PBBFAC,,, | Performed by: ADVANCED PRACTICE MIDWIFE

## 2018-07-19 RX ORDER — ONDANSETRON 8 MG/1
8 TABLET, ORALLY DISINTEGRATING ORAL EVERY 8 HOURS PRN
Status: DISCONTINUED | OUTPATIENT
Start: 2018-07-19 | End: 2018-07-22 | Stop reason: HOSPADM

## 2018-07-19 RX ORDER — HYDROMORPHONE HYDROCHLORIDE 1 MG/ML
1 INJECTION, SOLUTION INTRAMUSCULAR; INTRAVENOUS; SUBCUTANEOUS EVERY 4 HOURS PRN
Status: DISCONTINUED | OUTPATIENT
Start: 2018-07-19 | End: 2018-07-22 | Stop reason: HOSPADM

## 2018-07-19 RX ORDER — PHENYLEPHRINE HYDROCHLORIDE 10 MG/ML
INJECTION INTRAVENOUS
Status: DISCONTINUED | OUTPATIENT
Start: 2018-07-19 | End: 2018-07-19

## 2018-07-19 RX ORDER — OXYTOCIN/RINGER'S LACTATE 20/1000 ML
41.65 PLASTIC BAG, INJECTION (ML) INTRAVENOUS CONTINUOUS
Status: DISPENSED | OUTPATIENT
Start: 2018-07-19 | End: 2018-07-20

## 2018-07-19 RX ORDER — CHLORHEXIDINE GLUCONATE ORAL RINSE 1.2 MG/ML
10 SOLUTION DENTAL
Status: DISCONTINUED | OUTPATIENT
Start: 2018-07-19 | End: 2018-07-19

## 2018-07-19 RX ORDER — ONDANSETRON 2 MG/ML
INJECTION INTRAMUSCULAR; INTRAVENOUS
Status: DISCONTINUED | OUTPATIENT
Start: 2018-07-19 | End: 2018-07-19

## 2018-07-19 RX ORDER — OXYTOCIN 10 [USP'U]/ML
INJECTION, SOLUTION INTRAMUSCULAR; INTRAVENOUS
Status: DISCONTINUED | OUTPATIENT
Start: 2018-07-19 | End: 2018-07-19

## 2018-07-19 RX ORDER — FENTANYL CITRATE 50 UG/ML
INJECTION, SOLUTION INTRAMUSCULAR; INTRAVENOUS
Status: DISCONTINUED | OUTPATIENT
Start: 2018-07-19 | End: 2018-07-19

## 2018-07-19 RX ORDER — ACETAMINOPHEN 325 MG/1
650 TABLET ORAL EVERY 6 HOURS PRN
Status: DISCONTINUED | OUTPATIENT
Start: 2018-07-19 | End: 2018-07-22 | Stop reason: HOSPADM

## 2018-07-19 RX ORDER — DOCUSATE SODIUM 100 MG/1
100 CAPSULE, LIQUID FILLED ORAL DAILY
Status: DISCONTINUED | OUTPATIENT
Start: 2018-07-20 | End: 2018-07-22 | Stop reason: HOSPADM

## 2018-07-19 RX ORDER — SODIUM CHLORIDE, SODIUM LACTATE, POTASSIUM CHLORIDE, CALCIUM CHLORIDE 600; 310; 30; 20 MG/100ML; MG/100ML; MG/100ML; MG/100ML
INJECTION, SOLUTION INTRAVENOUS CONTINUOUS
Status: DISCONTINUED | OUTPATIENT
Start: 2018-07-19 | End: 2018-07-19

## 2018-07-19 RX ORDER — SODIUM CITRATE AND CITRIC ACID MONOHYDRATE 334; 500 MG/5ML; MG/5ML
30 SOLUTION ORAL
Status: DISCONTINUED | OUTPATIENT
Start: 2018-07-19 | End: 2018-07-19

## 2018-07-19 RX ORDER — DIPHENHYDRAMINE HCL 25 MG
25 CAPSULE ORAL EVERY 4 HOURS PRN
Status: DISCONTINUED | OUTPATIENT
Start: 2018-07-19 | End: 2018-07-20

## 2018-07-19 RX ORDER — DIPHENHYDRAMINE HYDROCHLORIDE 50 MG/ML
25 INJECTION INTRAMUSCULAR; INTRAVENOUS EVERY 4 HOURS PRN
Status: DISCONTINUED | OUTPATIENT
Start: 2018-07-19 | End: 2018-07-22 | Stop reason: HOSPADM

## 2018-07-19 RX ORDER — CEFAZOLIN SODIUM 1 G/50ML
2 SOLUTION INTRAVENOUS
Status: DISCONTINUED | OUTPATIENT
Start: 2018-07-19 | End: 2018-07-19

## 2018-07-19 RX ORDER — HYDROCORTISONE 25 MG/G
CREAM TOPICAL 3 TIMES DAILY PRN
Status: DISCONTINUED | OUTPATIENT
Start: 2018-07-19 | End: 2018-07-22 | Stop reason: HOSPADM

## 2018-07-19 RX ORDER — MIDAZOLAM HYDROCHLORIDE 1 MG/ML
INJECTION, SOLUTION INTRAMUSCULAR; INTRAVENOUS
Status: DISCONTINUED | OUTPATIENT
Start: 2018-07-19 | End: 2018-07-19

## 2018-07-19 RX ORDER — ADHESIVE BANDAGE
30 BANDAGE TOPICAL DAILY PRN
Status: DISCONTINUED | OUTPATIENT
Start: 2018-07-19 | End: 2018-07-22 | Stop reason: HOSPADM

## 2018-07-19 RX ORDER — KETOROLAC TROMETHAMINE 30 MG/ML
INJECTION, SOLUTION INTRAMUSCULAR; INTRAVENOUS
Status: DISCONTINUED | OUTPATIENT
Start: 2018-07-19 | End: 2018-07-19

## 2018-07-19 RX ORDER — MORPHINE SULFATE 1 MG/ML
INJECTION, SOLUTION EPIDURAL; INTRATHECAL; INTRAVENOUS
Status: DISCONTINUED | OUTPATIENT
Start: 2018-07-19 | End: 2018-07-19

## 2018-07-19 RX ORDER — SIMETHICONE 80 MG
1 TABLET,CHEWABLE ORAL EVERY 6 HOURS PRN
Status: DISCONTINUED | OUTPATIENT
Start: 2018-07-19 | End: 2018-07-22 | Stop reason: HOSPADM

## 2018-07-19 RX ORDER — KETOROLAC TROMETHAMINE 30 MG/ML
30 INJECTION, SOLUTION INTRAMUSCULAR; INTRAVENOUS EVERY 6 HOURS
Status: COMPLETED | OUTPATIENT
Start: 2018-07-20 | End: 2018-07-20

## 2018-07-19 RX ORDER — SODIUM CHLORIDE, SODIUM LACTATE, POTASSIUM CHLORIDE, CALCIUM CHLORIDE 600; 310; 30; 20 MG/100ML; MG/100ML; MG/100ML; MG/100ML
INJECTION, SOLUTION INTRAVENOUS CONTINUOUS PRN
Status: DISCONTINUED | OUTPATIENT
Start: 2018-07-19 | End: 2018-07-19

## 2018-07-19 RX ORDER — IBUPROFEN 800 MG/1
800 TABLET ORAL EVERY 8 HOURS
Status: DISCONTINUED | OUTPATIENT
Start: 2018-07-21 | End: 2018-07-22 | Stop reason: HOSPADM

## 2018-07-19 RX ORDER — BISACODYL 10 MG
10 SUPPOSITORY, RECTAL RECTAL ONCE AS NEEDED
Status: ACTIVE | OUTPATIENT
Start: 2018-07-19 | End: 2018-07-19

## 2018-07-19 RX ORDER — HYDROCODONE BITARTRATE AND ACETAMINOPHEN 10; 325 MG/1; MG/1
1 TABLET ORAL EVERY 4 HOURS PRN
Status: DISCONTINUED | OUTPATIENT
Start: 2018-07-19 | End: 2018-07-21

## 2018-07-19 RX ORDER — HYDROCODONE BITARTRATE AND ACETAMINOPHEN 5; 325 MG/1; MG/1
1 TABLET ORAL EVERY 4 HOURS PRN
Status: DISCONTINUED | OUTPATIENT
Start: 2018-07-19 | End: 2018-07-21

## 2018-07-19 RX ORDER — MISOPROSTOL 200 UG/1
800 TABLET ORAL
Status: DISCONTINUED | OUTPATIENT
Start: 2018-07-19 | End: 2018-07-19

## 2018-07-19 RX ADMIN — MORPHINE SULFATE 200 MCG: 1 INJECTION, SOLUTION EPIDURAL; INTRATHECAL; INTRAVENOUS at 05:07

## 2018-07-19 RX ADMIN — KETOROLAC TROMETHAMINE 30 MG: 30 INJECTION, SOLUTION INTRAMUSCULAR at 05:07

## 2018-07-19 RX ADMIN — HYDROMORPHONE HYDROCHLORIDE 1 MG: 1 INJECTION, SOLUTION INTRAMUSCULAR; INTRAVENOUS; SUBCUTANEOUS at 08:07

## 2018-07-19 RX ADMIN — AZITHROMYCIN MONOHYDRATE 500 MG: 500 INJECTION, POWDER, LYOPHILIZED, FOR SOLUTION INTRAVENOUS at 04:07

## 2018-07-19 RX ADMIN — HYDROCODONE BITARTRATE AND ACETAMINOPHEN 1 TABLET: 10; 325 TABLET ORAL at 07:07

## 2018-07-19 RX ADMIN — PHENYLEPHRINE HYDROCHLORIDE 100 MCG: 10 INJECTION INTRAVENOUS at 05:07

## 2018-07-19 RX ADMIN — SODIUM CHLORIDE, SODIUM LACTATE, POTASSIUM CHLORIDE, AND CALCIUM CHLORIDE 500 ML: .6; .31; .03; .02 INJECTION, SOLUTION INTRAVENOUS at 11:07

## 2018-07-19 RX ADMIN — SODIUM CHLORIDE, SODIUM LACTATE, POTASSIUM CHLORIDE, AND CALCIUM CHLORIDE: .6; .31; .03; .02 INJECTION, SOLUTION INTRAVENOUS at 04:07

## 2018-07-19 RX ADMIN — OXYTOCIN 20 UNITS: 10 INJECTION, SOLUTION INTRAMUSCULAR; INTRAVENOUS at 05:07

## 2018-07-19 RX ADMIN — FENTANYL CITRATE 50 MCG: 50 INJECTION, SOLUTION INTRAMUSCULAR; INTRAVENOUS at 05:07

## 2018-07-19 RX ADMIN — Medication 41.65 MILLI-UNITS/MIN: at 07:07

## 2018-07-19 RX ADMIN — SODIUM CHLORIDE, SODIUM LACTATE, POTASSIUM CHLORIDE, AND CALCIUM CHLORIDE: 600; 310; 30; 20 INJECTION, SOLUTION INTRAVENOUS at 04:07

## 2018-07-19 RX ADMIN — CEFAZOLIN 2 G: 1 INJECTION, POWDER, FOR SOLUTION INTRAMUSCULAR; INTRAVENOUS at 05:07

## 2018-07-19 RX ADMIN — ONDANSETRON 4 MG: 2 INJECTION, SOLUTION INTRAMUSCULAR; INTRAVENOUS at 05:07

## 2018-07-19 RX ADMIN — DIPHENHYDRAMINE HYDROCHLORIDE 25 MG: 25 CAPSULE ORAL at 07:07

## 2018-07-19 RX ADMIN — MIDAZOLAM 2 MG: 1 INJECTION INTRAMUSCULAR; INTRAVENOUS at 05:07

## 2018-07-19 NOTE — OP NOTE
OPERATIVE NOTE    DATE OF PROCEDURE:  2018    PREOPERATIVE DIAGNOSIS:    1.  Twin pregnancy at 37 weeks 4 days EGA  2.  Fetal growth restriction    POSTOPERATIVE DIAGNOSIS   1.  Twin pregnancy at 37 weeks 4 days EGA  2.  Fetal growth restriction  3.  Two viable male infants    OPERATIVE PROCEDURE:  Primary  Low Transverse  Section    SURGEON:  Amalia Yoon MD    ASSISTANT:  AMANDA Levin - medically needed for procedure    ANESTHESIA:  Spinal    ESTIMATED BLOOD LOSS:  400 ml    FINDINGS:  Normal uterus, tubes, and ovaries.  Viable twin infants.  Two separate placentas noted at delivery, indicating dichorionic, diamnionic twins.    PROSTETICS/DEVICES: None    COMPLICATIONS:  None    SPECIMEN:  None     PROCEDURE IN DETAIL:   The procedure was explained to the patient and informed consent was obtained. The patient was brought to the operating room where spinal anesthesia was administered. A knox catheter was placed, and she was prepped and draped in the usual sterile manner. A time out was performed. A pfannensteil skin incision was made with the scalpel and the incision was extended sharply to the rectus fascia. The fascial incision was extended bilaterally with emanuel scissors and grasped with [facility] clamps. The fascia was dissected off of the underlying rectus muscles both superiorly and inferiorly. The rectus muscles were divided in the midline with sharp and blunt dissection. The peritoneum was grasped between two hemostats and entered sharply. The peritoneal incision was extended bluntly. A bladder retractor was placed. The vesicouterine peritoneum was incised with metzenbaum scissors and extended laterally to create the bladder flap. The bladder flap was taken down with blunt dissection. The lower uterine segment was incised in a transverse fashion using the scapel. This incision was extended laterally with bandage scissors. Membranes were ruptured with clear fluid noted. The first  infant was delivered from the vertex position without difficulty. The nose and mouth were suctioned and the umbilical cord was doubly clamped and cut. The infant was handed off to the nursery staff in attendance.  The membranes of the second baby were then ruptured with clear fluid noted.  The second baby was delivered from the footling breech position without difficulty. The placenta was delivered spontaneously and the uterus was cleared of all clots and debris. The uterine incision was closed with a number one Chromic suture in a running locking fashion. A single layer closure was performed. Good hemostasis was noted. The pelvis was irrigated and was well suctioned of all remaining blood clots. The rectus muscles were closed with 2-0 Vicryl suture with interrupted figure of eight sutures. The fascia was closed with a 0 looped PDS suture in a running fashion. The skin incision was well irrigated and closed with 4-0 Vicryl in a subcuticular fashion. Steri strips and a sterile dressing were placed over the incision. The patient tolerated the procedure well and was brought to the recovery room in stable condition. All counts were correct x 3.

## 2018-07-19 NOTE — ANESTHESIA PREPROCEDURE EVALUATION
07/19/2018  Kumar Mccoy is a 18 y.o., female.    Anesthesia Evaluation    I have reviewed the Patient Summary Reports.    I have reviewed the Nursing Notes.   I have reviewed the Medications.     Review of Systems  Anesthesia Hx:  History of prior surgery of interest to airway management or planning: Previous anesthesia: General I&D of arm as child with general anesthesia.  Denies Family Hx of Anesthesia complications.   Denies Personal Hx of Anesthesia complications.   Social:  Non-Smoker, No Alcohol Use    Hematology/Oncology:  Hematology Normal   Oncology Normal     EENT/Dental:EENT/Dental Normal   Pulmonary:  Pulmonary Normal    Renal/:  Renal/ Normal     Hepatic/GI:  Hepatic/GI Normal    Musculoskeletal:  Musculoskeletal Normal    Neurological:  Neurology Normal    Endocrine:  Endocrine Normal    Dermatological:  Skin Normal    Psych:  Psychiatric Normal           Physical Exam  General:  Well nourished    Airway/Jaw/Neck:  Airway Findings: Mouth Opening: Normal Tongue: Normal  General Airway Assessment: Adult  Mallampati: II  Improves to II with phonation.  TM Distance: Normal, at least 6 cm      Dental:  Dental Findings: In tact        Mental Status:  Mental Status Findings:  Cooperative         Anesthesia Plan  Type of Anesthesia, risks & benefits discussed:  Anesthesia Type:  spinal  Patient's Preference:   Intra-op Monitoring Plan:   Intra-op Monitoring Plan Comments:   Post Op Pain Control Plan:   Post Op Pain Control Plan Comments:   Induction:    Beta Blocker:  Patient is not currently on a Beta-Blocker (No further documentation required).       Informed Consent: Patient understands risks and agrees with Anesthesia plan.  Questions answered. Anesthesia consent signed with patient.  ASA Score: 2     Day of Surgery Review of History & Physical: I have interviewed and examined the patient. I  have reviewed the patient's H&P dated:  There are no significant changes.          Ready For Surgery From Anesthesia Perspective.

## 2018-07-19 NOTE — LACTATION NOTE
Visited mother at bedside.   Mother has experience breast feeeding. Reviewed with mother currents recommendations with late pre-term babies. Also gave anticipatory guidance should babies go to the NICU. Mother verbalized understanding.

## 2018-07-19 NOTE — HOSPITAL COURSE
Admit, pt requests  section. Pt underwent uncomplicated primary LTCS. Both infants doing well on room air, in room w/ patient  18 0100 Notified per RN of low  H&H results and patient being symptomatic.  Results of H&H and patients symptoms reported to Ezekiel Geronimo.  Orders to transfuse 3 units PRBC's noted.  18 - patient doing well and is asymptomatic..  H/H  s/p transfusion.  Stable for discharge to home.

## 2018-07-19 NOTE — PROGRESS NOTES
D/W Dr. Yoon and pt was given option and risks and detailed education doen on labor induction and c/s. Pt and  both request a c/s. Pt has eaten at 08;00 this morning and is aware of schedule at 16;00. Anesthesia aware and Dr. Yoon of pt request.

## 2018-07-19 NOTE — NURSING
"Discussed feeding choice with mother.  Reviewed benefits of breastfeeding and risks of formula feeding. Patient given "What to Expect in the First 48 Hours" handout. Mother states her intention is breastfeeding.      "

## 2018-07-19 NOTE — ANESTHESIA PROCEDURE NOTES
Spinal    Diagnosis: previous  Patient location during procedure: OB  Start time: 7/19/2018 4:52 PM  Timeout: 7/19/2018 4:52 PM  Staffing  Anesthesiologist: STACEY YANG  Performed: anesthesiologist   Preanesthetic Checklist  Completed: patient identified, surgical consent, pre-op evaluation, timeout performed, IV checked, risks and benefits discussed and monitors and equipment checked  Spinal Block  Patient position: sitting

## 2018-07-19 NOTE — PROGRESS NOTES
Ultrasound today with:  A:   Cephalic presentation, anterior/grade 3 placenta, BPP 2/8, FHTs 150bpm, EFW 3%  B:   Transverse presentation, anterior placenta, BPP 8/8, FHTs 137bpm, EFW 4%  Discussed results with pt and , recommend L&D evaluation, CNM aware

## 2018-07-19 NOTE — H&P
[facility] -   Obstetrics  History & Physical    Patient Name: Kumar Mccoy  MRN: [id number]  Admission Date: 2018  Primary Care Provider: Primary Doctor No    Subjective:     Principal Problem:IUGR (intrauterine growth restriction) affecting care of mother, third trimester, fetus 1    History of Present Illness:  Presents from clinic with IUGR baby a, 3%, BPP 2/8, and Baby B 4%    Obstetric HPI:  Pt was sent from [facility] for IUGR twins x 2 baby a 3%, bpp 2/8, baby b 4%, active fetal movement x 2, No vaginal bleeding , No loss of fluid     This pregnancy has been complicated by Twins mono/di, late entry to care limited prenatal care, hx of pre e in prior pregnancy.     Obstetric History       T1      L1     SAB0   TAB0   Ectopic0   Multiple0   Live Births1       # Outcome Date GA Lbr Peyman/2nd Weight Sex Delivery Anes PTL Lv   2 Current            1 Term 16 39w5d  3.005 kg (6 lb 10 oz) M Vag-Spont EPI N HARSHAL      Complications: PIH (pregnancy induced hypertension)        Past Medical History:   Diagnosis Date    Anemia     Hypertension      Past Surgical History:   Procedure Laterality Date    I&D LEFT ARM      TONSILLECTOMY, ADENOIDECTOMY, BILATERAL MYRINGOTOMY AND TUBES         PTA Medications   Medication Sig    diphenhydramine-acetaminophen (TYLENOL PM)  mg Tab Take 1 tablet by mouth as needed.       Review of patient's allergies indicates:  No Known Allergies     Family History     Problem Relation (Age of Onset)    Cancer Maternal Grandmother    Diabetes Maternal Grandfather        Social History Main Topics    Smoking status: Never Smoker    Smokeless tobacco: Never Used    Alcohol use No    Drug use: No    Sexual activity: Yes     Partners: Male     Review of Systems   Gastrointestinal: Positive for abdominal pain.   Genitourinary: Negative for vaginal bleeding, vaginal discharge, vaginal pain and vaginal odor.   All other systems reviewed and are negative.      Objective:     Vital Signs (Most Recent):  Temp: 98.5 °F (36.9 °C) (18 1015)  Pulse: 83 (18 1015)  Resp: 18 (18 1015)  BP: 132/82 (18 1015) Vital Signs (24h Range):  Temp:  [98.5 °F (36.9 °C)] 98.5 °F (36.9 °C)  Pulse:  [83] 83  Resp:  [18] 18  BP: (132)/(82) 132/82     Weight: 97.5 kg (215 lb)  Body mass index is 29.99 kg/m².    FHT: A:130 Cat 2 (reassuring) B: 135 Reasurring cat 1   TOCO:  Q ireg-5 minutes    Physical Exam:   Constitutional: She is oriented to person, place, and time. She appears well-developed and well-nourished.    HENT:   Head: Normocephalic.    Eyes: Pupils are equal, round, and reactive to light.    Neck: Normal range of motion.    Cardiovascular: Normal rate and regular rhythm.     Pulmonary/Chest: Effort normal.        Abdominal: Soft.     Genitourinary: Vagina normal.           Musculoskeletal: Normal range of motion.       Neurological: She is alert and oriented to person, place, and time.    Skin: Skin is warm and dry.    Psychiatric: She has a normal mood and affect. Her behavior is normal. Judgment and thought content normal.       Cervix:  Dilation:  3  Effacement:  50%  Station: -2  Presentation: Vertex , transverse     Significant Labs:  Lab Results   Component Value Date    GROUPTRH O POS 2018    HEPBSAG Negative 2018    STREPBCULT  2018     STREPTOCOCCUS AGALACTIAE (GROUP B)  Beta-hemolytic streptococci are routinely susceptible to   penicillins,cephalosporins and carbapenems.         I have personallly reviewed all pertinent lab results from the last 24 hours.    Assessment/Plan:     18 y.o. female  at 37w4d for:    No notes have been filed under this hospital service.  Service: Obstetrics and Gynecology      Merari Leija CNM  Obstetrics  [facility] -

## 2018-07-19 NOTE — SUBJECTIVE & OBJECTIVE
Obstetric HPI:  Pt was sent from [facility] for IUGR twins x 2 baby a 3%, bpp 2/8, baby b 4%, active fetal movement x 2, No vaginal bleeding , No loss of fluid     This pregnancy has been complicated by Twins mono/di, late entry to care limited prenatal care, hx of pre e in prior pregnancy.     Obstetric History       T1      L1     SAB0   TAB0   Ectopic0   Multiple0   Live Births1       # Outcome Date GA Lbr Peyman/2nd Weight Sex Delivery Anes PTL Lv   2 Current            1 Term 16 39w5d  3.005 kg (6 lb 10 oz) M Vag-Spont EPI N HARSHAL      Complications: PIH (pregnancy induced hypertension)        Past Medical History:   Diagnosis Date    Anemia     Hypertension      Past Surgical History:   Procedure Laterality Date    I&D LEFT ARM      TONSILLECTOMY, ADENOIDECTOMY, BILATERAL MYRINGOTOMY AND TUBES         PTA Medications   Medication Sig    diphenhydramine-acetaminophen (TYLENOL PM)  mg Tab Take 1 tablet by mouth as needed.       Review of patient's allergies indicates:  No Known Allergies     Family History     Problem Relation (Age of Onset)    Cancer Maternal Grandmother    Diabetes Maternal Grandfather        Social History Main Topics    Smoking status: Never Smoker    Smokeless tobacco: Never Used    Alcohol use No    Drug use: No    Sexual activity: Yes     Partners: Male     Review of Systems   Gastrointestinal: Positive for abdominal pain.   Genitourinary: Negative for vaginal bleeding, vaginal discharge, vaginal pain and vaginal odor.   All other systems reviewed and are negative.     Objective:     Vital Signs (Most Recent):  Temp: 98.5 °F (36.9 °C) (18 1015)  Pulse: 83 (18 1015)  Resp: 18 (18 1015)  BP: 132/82 (18 1015) Vital Signs (24h Range):  Temp:  [98.5 °F (36.9 °C)] 98.5 °F (36.9 °C)  Pulse:  [83] 83  Resp:  [18] 18  BP: (132)/(82) 132/82     Weight: 97.5 kg (215 lb)  Body mass index is 29.99 kg/m².    FHT: A:130 Cat 2 (reassuring) B: 135  Reasurring cat 1   TOCO:  Q ireg-5 minutes    Physical Exam:   Constitutional: She is oriented to person, place, and time. She appears well-developed and well-nourished.    HENT:   Head: Normocephalic.    Eyes: Pupils are equal, round, and reactive to light.    Neck: Normal range of motion.    Cardiovascular: Normal rate and regular rhythm.     Pulmonary/Chest: Effort normal.        Abdominal: Soft.     Genitourinary: Vagina normal.           Musculoskeletal: Normal range of motion.       Neurological: She is alert and oriented to person, place, and time.    Skin: Skin is warm and dry.    Psychiatric: She has a normal mood and affect. Her behavior is normal. Judgment and thought content normal.       Cervix:  Dilation:  3  Effacement:  50%  Station: -2  Presentation: Vertex , transverse     Significant Labs:  Lab Results   Component Value Date    GROUPTRH O POS 06/20/2018    HEPBSAG Negative 06/20/2018    STREPBCULT  06/20/2018     STREPTOCOCCUS AGALACTIAE (GROUP B)  Beta-hemolytic streptococci are routinely susceptible to   penicillins,cephalosporins and carbapenems.         I have personallly reviewed all pertinent lab results from the last 24 hours.

## 2018-07-20 LAB
RUBV IGG SER-ACNC: 8.5 IU/ML
RUBV IGG SER-IMP: ABNORMAL

## 2018-07-20 PROCEDURE — 63600175 PHARM REV CODE 636 W HCPCS: Performed by: OBSTETRICS & GYNECOLOGY

## 2018-07-20 PROCEDURE — 25000003 PHARM REV CODE 250: Performed by: OBSTETRICS & GYNECOLOGY

## 2018-07-20 PROCEDURE — 11000001 HC ACUTE MED/SURG PRIVATE ROOM

## 2018-07-20 PROCEDURE — 99232 SBSQ HOSP IP/OBS MODERATE 35: CPT | Mod: ,,, | Performed by: OBSTETRICS & GYNECOLOGY

## 2018-07-20 RX ORDER — HYDROXYZINE PAMOATE 25 MG/1
25 CAPSULE ORAL EVERY 8 HOURS PRN
Status: DISCONTINUED | OUTPATIENT
Start: 2018-07-20 | End: 2018-07-22 | Stop reason: HOSPADM

## 2018-07-20 RX ORDER — DIPHENHYDRAMINE HCL 25 MG
25 CAPSULE ORAL EVERY 4 HOURS PRN
Status: DISCONTINUED | OUTPATIENT
Start: 2018-07-20 | End: 2018-07-22 | Stop reason: HOSPADM

## 2018-07-20 RX ADMIN — KETOROLAC TROMETHAMINE 30 MG: 30 INJECTION, SOLUTION INTRAMUSCULAR; INTRAVENOUS at 12:07

## 2018-07-20 RX ADMIN — KETOROLAC TROMETHAMINE 30 MG: 30 INJECTION, SOLUTION INTRAMUSCULAR; INTRAVENOUS at 11:07

## 2018-07-20 RX ADMIN — KETOROLAC TROMETHAMINE 30 MG: 30 INJECTION, SOLUTION INTRAMUSCULAR; INTRAVENOUS at 05:07

## 2018-07-20 RX ADMIN — KETOROLAC TROMETHAMINE 30 MG: 30 INJECTION, SOLUTION INTRAMUSCULAR; INTRAVENOUS at 07:07

## 2018-07-20 RX ADMIN — HYDROCODONE BITARTRATE AND ACETAMINOPHEN 1 TABLET: 10; 325 TABLET ORAL at 08:07

## 2018-07-20 RX ADMIN — HYDROCODONE BITARTRATE AND ACETAMINOPHEN 1 TABLET: 10; 325 TABLET ORAL at 02:07

## 2018-07-20 RX ADMIN — HYDROCODONE BITARTRATE AND ACETAMINOPHEN 1 TABLET: 10; 325 TABLET ORAL at 12:07

## 2018-07-20 RX ADMIN — HYDROCODONE BITARTRATE AND ACETAMINOPHEN 1 TABLET: 10; 325 TABLET ORAL at 07:07

## 2018-07-20 RX ADMIN — DOCUSATE SODIUM 100 MG: 100 CAPSULE, LIQUID FILLED ORAL at 08:07

## 2018-07-20 RX ADMIN — HYDROXYZINE PAMOATE 25 MG: 25 CAPSULE ORAL at 09:07

## 2018-07-20 RX ADMIN — DIPHENHYDRAMINE HYDROCHLORIDE 25 MG: 25 CAPSULE ORAL at 02:07

## 2018-07-20 NOTE — SUBJECTIVE & OBJECTIVE
Hospital course: Admit, pt requests  section. Pt underwent uncomplicated primary LTCS. Both infants doing well on room air, in room w/ patient    Interval History: s/p primary LTCS    She is doing well this morning. She is tolerating a regular diet without nausea or vomiting. She is voiding spontaneously. She is ambulating. She has not passed flatus, and has not a BM. Vaginal bleeding is mild. She denies fever or chills. Abdominal pain is moderate and controlled with oral medications. She is not breastfeeding. She desires circumcision for her male baby: yes.    Objective:     Vital Signs (Most Recent):  Temp: 97.8 °F (36.6 °C) (18)  Pulse: 83 (18)  Resp: 18 (18)  BP: (!) 115/54 (18)  SpO2: 98 % (18) Vital Signs (24h Range):  Temp:  [97.8 °F (36.6 °C)-98.5 °F (36.9 °C)] 97.8 °F (36.6 °C)  Pulse:  [52-95] 83  Resp:  [18] 18  SpO2:  [98 %-100 %] 98 %  BP: (115-136)/(54-85) 115/54     Weight: 97.5 kg (215 lb)  Body mass index is 29.99 kg/m².      Intake/Output Summary (Last 24 hours) at 18 0820  Last data filed at 18 0530   Gross per 24 hour   Intake          3632.95 ml   Output             1600 ml   Net          2032.95 ml       Significant Labs:  Lab Results   Component Value Date    GROUPTRH O POS 2018    HEPBSAG Negative 2018    STREPBCULT  2018     STREPTOCOCCUS AGALACTIAE (GROUP B)  Beta-hemolytic streptococci are routinely susceptible to   penicillins,cephalosporins and carbapenems.         Recent Labs  Lab 18  1048   HGB 9.2*   HCT 27.9*       I have personallly reviewed all pertinent lab results from the last 24 hours.    Physical Exam:   Constitutional: She is oriented to person, place, and time. She appears well-developed and well-nourished.        Pulmonary/Chest: Effort normal.        Abdominal: Soft. She exhibits abdominal incision. She exhibits no distension. There is tenderness.   Fundus firm. Bandage  c/d/i. Tenderness approp     Genitourinary:   Genitourinary Comments: Normal lochia           Musculoskeletal: Moves all extremeties.       Neurological: She is alert and oriented to person, place, and time.    Skin: Skin is warm and dry.    Psychiatric: She has a normal mood and affect. Her behavior is normal.

## 2018-07-20 NOTE — LACTATION NOTE
"Lactation Rounds: Mother states feedings have been "going well". Baby B on mother's chest, suckling on pacifier. Discussed risks of introduction of artificial nipple with mother.  Encouraged mother to put baby to breast when feeding cues are present.  Discussed the AAP recommendation of no bottle nipples or pacifiers until at least 1 month of age for breastfeeding infants. Mother states understanding and verbalized appropriate recall. Mother states that her intention is to offer an artificial nipple at this time. Baby A swaddled and sleeping in crib. Mother states she plans to place infant skin to skin "soon" to initiate a feeding. Mother states baby B has been more vigorous with feedings and baby A has been sleepy.   Lactation packet given and admit information reviewed. Mother verbalizes understanding of expected  behaviors and output for the first 48 hours of life.  Discussed the importance of cue based feedings on demand, unrestricted access to the breast, and frequent uninterrupted skin to skin contact.  Risk and implications of artificial nipples and non medically indicated formula supplementation discussed.  Encouraged mother to call for assistance when desired or when infant is showing signs of hunger, contact number provided. Mother nodded head to all teaching.  Mother distracted throughout encounter, talking on cell phone, scrolling through phone, avoiding eye contact. Strongly encouraged to call when babies exhibit feeding cues for feeding assessment.      18 1035   Infant Assessment   Number of Stools (24 hours) (1/0)   Number of Voids (24 hours) (3/3)   Lactation Interventions   Attachment Promotion counseling provided;skin-to-skin contact encouraged;rooming-in promoted;privacy provided;infant-mother separation minimized   Breastfeeding Assistance both breasts offered each feeding;feeding cue recognition promoted;feeding on demand promoted;support offered   Maternal Breastfeeding Support " diary/feeding log utilized;encouragement offered;infant-mother separation minimized;maternal hydration promoted;maternal nutrition promoted;maternal rest encouraged;lactation counseling provided

## 2018-07-20 NOTE — PLAN OF CARE
Problem: Patient Care Overview  Goal: Plan of Care Review  Outcome: Ongoing (interventions implemented as appropriate)  Vitals are stable, bleeding light, fundus firm. Hanson removed and patient ambulating to bathroom. Patient's pain is controlled with scheduled toradol and prn pain medications. Nurse encouraged patient to drink lots of fluids. Nurse encouraged patient to call for assistance if needed.

## 2018-07-20 NOTE — PROGRESS NOTES
[facility] -   Obstetrics  Postpartum Progress Note    Patient Name: Kumar Mccoy  MRN: [id number]  Admission Date: 2018  Hospital Length of Stay: 1 days  Attending Physician: Torie Carcamo MD  Primary Care Provider: Primary Doctor No    Subjective:     Principal Problem:S/P primary low transverse     Hospital course: Admit, pt requests  section. Pt underwent uncomplicated primary LTCS. Both infants doing well on room air, in room w/ patient    Interval History: s/p primary LTCS    She is doing well this morning. She is tolerating a regular diet without nausea or vomiting. She is voiding spontaneously. She is ambulating. She has not passed flatus, and has not a BM. Vaginal bleeding is mild. She denies fever or chills. Abdominal pain is moderate and controlled with oral medications. She is not breastfeeding. She desires circumcision for her male baby: yes.    Objective:     Vital Signs (Most Recent):  Temp: 97.8 °F (36.6 °C) (18)  Pulse: 83 (18)  Resp: 18 (18)  BP: (!) 115/54 (18)  SpO2: 98 % (18) Vital Signs (24h Range):  Temp:  [97.8 °F (36.6 °C)-98.5 °F (36.9 °C)] 97.8 °F (36.6 °C)  Pulse:  [52-95] 83  Resp:  [18] 18  SpO2:  [98 %-100 %] 98 %  BP: (115-136)/(54-85) 115/54     Weight: 97.5 kg (215 lb)  Body mass index is 29.99 kg/m².      Intake/Output Summary (Last 24 hours) at 18 0820  Last data filed at 18 0530   Gross per 24 hour   Intake          3632.95 ml   Output             1600 ml   Net          2032.95 ml       Significant Labs:  Lab Results   Component Value Date    GROUPTRH O POS 2018    HEPBSAG Negative 2018    STREPBCULT  2018     STREPTOCOCCUS AGALACTIAE (GROUP B)  Beta-hemolytic streptococci are routinely susceptible to   penicillins,cephalosporins and carbapenems.         Recent Labs  Lab 18  1048   HGB 9.2*   HCT 27.9*       I have personallly reviewed all pertinent lab  results from the last 24 hours.    Physical Exam:   Constitutional: She is oriented to person, place, and time. She appears well-developed and well-nourished.        Pulmonary/Chest: Effort normal.        Abdominal: Soft. She exhibits abdominal incision. She exhibits no distension. There is tenderness.   Fundus firm. Bandage c/d/i. Tenderness approp     Genitourinary:   Genitourinary Comments: Normal lochia           Musculoskeletal: Moves all extremeties.       Neurological: She is alert and oriented to person, place, and time.    Skin: Skin is warm and dry.    Psychiatric: She has a normal mood and affect. Her behavior is normal.       Assessment/Plan:     18 y.o. female  for:    Late prenatal care  Di-di twin gestation w/ IUGR  S/p primary LTCS pt per request  GBS +    Disposition: As patient meets milestones, will plan to discharge in AM.    Leigh Geronimo MD  Obstetrics  [facility] -

## 2018-07-20 NOTE — PLAN OF CARE
Problem: Patient Care Overview  Goal: Plan of Care Review  Outcome: Ongoing (interventions implemented as appropriate)  Pt progressing well. Breastfeeding, appears to be bonding well with babies. Pain controlled with po meds, and scheduled iv toradol. aquacel CDI.

## 2018-07-20 NOTE — PLAN OF CARE
Problem: Postpartum ( Delivery) (Adult,Obstetrics,Pediatric)  Goal: Signs and Symptoms of Listed Potential Problems Will be Absent, Minimized or Managed (Postpartum)  Signs and symptoms of listed potential problems will be absent, minimized or managed by discharge/transition of care (reference Postpartum ( Delivery) (Adult,Obstetrics,Pediatric) [facility]).  Pain controlled at this time, vital signs stable, bonding well with newborns, FOB at bedside for support

## 2018-07-20 NOTE — ANESTHESIA RELEASE NOTE
"Anesthesia Release from PACU Note    Patient: Kumar Mccoy    Procedure(s) Performed: * No procedures listed *    Anesthesia type: spinal    Post pain: Adequate analgesia    Post assessment: no apparent anesthetic complications and tolerated procedure well    Last Vitals:   Visit Vitals  /75   Pulse 69   Temp 36.7 °C (98 °F) (Oral)   Resp 18   Ht 5' 11" (1.803 m)   Wt 97.5 kg (215 lb)   SpO2 98%   Breastfeeding? No   BMI 29.99 kg/m²       Post vital signs: stable    Level of consciousness: awake    Nausea/Vomiting: no nausea/no vomiting    Complications: none    Airway Patency: patent    Respiratory: unassisted    Cardiovascular: stable and blood pressure at baseline    Hydration: euvolemic  "

## 2018-07-20 NOTE — TRANSFER OF CARE
"Anesthesia Transfer of Care Note    Patient: Kumar Mccoy    Procedure(s) Performed: * No procedures listed *    Patient location: Labor and Delivery    Anesthesia Type: spinal    Transport from OR: Transported from OR on room air with adequate spontaneous ventilation    Post pain: adequate analgesia    Post assessment: no apparent anesthetic complications    Post vital signs: stable    Level of consciousness: awake, alert and oriented    Nausea/Vomiting: no nausea/vomiting    Complications: none    Transfer of care protocol was followed      Last vitals:   Visit Vitals  /75   Pulse 69   Temp 36.7 °C (98 °F) (Oral)   Resp 18   Ht 5' 11" (1.803 m)   Wt 97.5 kg (215 lb)   SpO2 98%   Breastfeeding? No   BMI 29.99 kg/m²     "

## 2018-07-20 NOTE — L&D DELIVERY NOTE
Tahir Mccoy [[id number]]     Delivery Information for  Tahir Mccoy    Birth information:  YOB: 2020   Time of birth: 5:19 PM   Sex: male   Head Delivery Date/Time: 2018  5:19 PM   Delivery type: , Low Transverse   Gestational Age: 37w4d    Delivery Providers    Delivering clinician:  Amalia Yoon MD   Provider Role    Flor Miner RN Registered Nurse    Selena Thompson, RN Registered Nurse    Joy Lopez, RN Registered Nurse    Bertha Paz, PATAVIA First Assist    Cyndee Mike, ST Surgical Tech             Measurements    Weight:  2560 g  Length:  48 cm  Head circumference:  31.5 cm  Chest circumference:  30 cm  Abdominal girth:  27.5 cm         [place] Assessment    Living status:  Living  Apgars:     1 Minute:   5 Minute:   10 Minute:   15 Minute:   20 Minute:     Skin Color:   0  0       Heart Rate:   2  2       Reflex Irritability:   2  2       Muscle Tone:   2  2       Respiratory Effort:   2  2       Total:   8  8               Apgars Assigned By:  TAMIKA THOMPSON RN         Assisted Delivery Details:    Forceps attempted?:  No  Vacuum extractor attempted?:  No         Shoulder Dystocia    Shoulder dystocia present?:  No           Presentation and Position    Presentation:  Vertex  Position:  Left Occiput Anterior           Interventions/Resuscitation    Method:  Bulb Suctioning, Tactile Stimulation, Deep Suctioning       Cord    Vessels:  3 vessels  Complications:  None  Delayed Cord Clamping?:  Yes  Cord Clamped Date/Time:  2018  5:20 PM  Cord Blood Disposition:  Lab  Gases Sent?:  No  Stem Cell Collection (by MD):  No       Placenta    Date and time:  2018  5:23 PM  Removal:  Manual removal  Appearance:  Intact  Placenta disposition:  pathology           Labor Events:       labor:       Labor Onset Date/Time:         Dilation Complete Date/Time:         Start Pushing Date/Time:       Rupture Date/Time:               Rupture type:           Fluid Amount:        Fluid Color:        Fluid Odor:        Membrane Status (PeriCalm):        Rupture Date/Time (PeriCalm):        Fluid Amount (PeriCalm):        Fluid Color (PeriCalm):         steroids:       Antibiotics given for GBS:       Induction:       Indications for induction:        Augmentation:       Indications for augmentation:       Labor complications:       Additional complications:          Cervical ripening:                     Delivery:      Episiotomy:       Indication for Episiotomy:       Perineal Lacerations:   Repaired:      Periurethral Laceration:   Repaired:     Labial Laceration:   Repaired:     Sulcus Laceration:   Repaired:     Vaginal Laceration:   Repaired:     Cervical Laceration:   Repaired:     Repair suture:       Repair # of packets:       Vaginal delivery QBL (mL): 0      QBL (mL): 0     Combined Blood Loss (mL): 0     Vaginal Sweep Performed:       Surgicount Correct:         Other providers:       Anesthesia    Method:  Spinal          Details (if applicable):  Trial of Labor No    Categorization: Primary    Priority: Routine   Indications for : Multiple Gestation   Incision Type: low transverse     Additional  information:  Forceps:    Vacuum:    Breech:    Observed anomalies    Other (Comments):            Darius ALEXANDER Heath [[id number]]     Delivery Information for ALEXANDER Mccoy    Birth information:  YOB: 2020   Time of birth: 5:21 PM   Sex: male   Head Delivery Date/Time: 2018  5:21 PM   Delivery type: , Low Transverse   Gestational Age: 37w4d    Delivery Providers    Delivering clinician:  Amalia Yoon MD   Provider Role    Flor Miner RN Registered Nurse    Selena Christensen RN Registered Nurse    Joy Lopez RN Registered Nurse    Bertha Paz PA-C First Assist    Cyndee Mckeon, ST Surgical Tech              Measurements    Weight:  2510 g  Length:  47.5 cm  Head circumference:  33 cm  Chest circumference:  29.5 cm  Abdominal girth:  28 cm          Assessment    Living status:  Living  Apgars:     1 Minute:   5 Minute:   10 Minute:   15 Minute:   20 Minute:     Skin Color:   0  1       Heart Rate:   2  2       Reflex Irritability:   2  2       Muscle Tone:   2  2       Respiratory Effort:   2  2       Total:   8  9               Apgars Assigned By:  KHANH YEE RN         Assisted Delivery Details:    Forceps attempted?:  No  Vacuum extractor attempted?:  No         Shoulder Dystocia    Shoulder dystocia present?:  No           Presentation and Position    Presentation:  Footling Breech  Position:  Middle           Interventions/Resuscitation    Method:  Tactile Stimulation, Bulb Suctioning       Cord    Vessels:  3 vessels  Complications:  None  Delayed Cord Clamping?:  Yes  Cord Clamped Date/Time:  2018  5:22 PM  Cord Blood Disposition:  Lab  Gases Sent?:  No  Stem Cell Collection (by MD):  No       Placenta    Date and time:  2018  5:23 PM  Removal:  Manual removal  Appearance:  Intact  Placenta disposition:  pathology           Labor Events:       labor:       Labor Onset Date/Time:         Dilation Complete Date/Time:         Start Pushing Date/Time:       Rupture Date/Time:              Rupture type:           Fluid Amount:        Fluid Color:        Fluid Odor:        Membrane Status (PeriCalm):        Rupture Date/Time (PeriCalm):        Fluid Amount (PeriCalm):        Fluid Color (PeriCalm):         steroids:       Antibiotics given for GBS:       Induction:       Indications for induction:        Augmentation:       Indications for augmentation:       Labor complications:       Additional complications:          Cervical ripening:                     Delivery:      Episiotomy:       Indication for Episiotomy:       Perineal Lacerations:   Repaired:      Periurethral Laceration:    Repaired:     Labial Laceration:   Repaired:     Sulcus Laceration:   Repaired:     Vaginal Laceration:   Repaired:     Cervical Laceration:   Repaired:     Repair suture:       Repair # of packets:       Vaginal delivery QBL (mL): 0      QBL (mL): 0     Combined Blood Loss (mL): 0     Vaginal Sweep Performed:       Surgicount Correct:         Other providers:       Anesthesia    Method:  Spinal          Details (if applicable):  Trial of Labor No    Categorization: Primary    Priority:     Indications for : Multiple Gestation   Incision Type: low transverse     Additional  information:  Forceps:    Vacuum:    Breech:    Observed anomalies    Other (Comments):

## 2018-07-20 NOTE — ANESTHESIA POSTPROCEDURE EVALUATION
"Anesthesia Post Evaluation    Patient: Kumar Mccoy    Procedure(s) Performed: * No procedures listed *    Final Anesthesia Type: spinal  Patient location during evaluation: labor & delivery  Patient participation: Yes- Able to Participate  Level of consciousness: awake and alert  Post-procedure vital signs: reviewed and stable  Pain management: adequate  Airway patency: patent  PONV status at discharge: No PONV  Anesthetic complications: no      Cardiovascular status: blood pressure returned to baseline  Respiratory status: unassisted  Hydration status: euvolemic  Follow-up not needed.        Visit Vitals  /75   Pulse 69   Temp 36.7 °C (98 °F) (Oral)   Resp 18   Ht 5' 11" (1.803 m)   Wt 97.5 kg (215 lb)   SpO2 98%   Breastfeeding? No   BMI 29.99 kg/m²       Pain/Pooja Score: Pain Rating Prior to Med Admin: 9 (7/19/2018  8:47 PM)  Pain Rating Post Med Admin: 5 (7/19/2018  9:30 PM)  Pooja Score: 9 (7/19/2018  6:45 PM)      "

## 2018-07-21 PROBLEM — D62 ANEMIA ASSOCIATED WITH ACUTE BLOOD LOSS: Status: ACTIVE | Noted: 2018-07-21

## 2018-07-21 LAB
BASOPHILS # BLD AUTO: 0.01 K/UL
BASOPHILS # BLD AUTO: 0.03 K/UL
BASOPHILS NFR BLD: 0.1 %
BASOPHILS NFR BLD: 0.2 %
BLD PROD TYP BPU: NORMAL
BLOOD UNIT EXPIRATION DATE: NORMAL
BLOOD UNIT TYPE CODE: 5100
BLOOD UNIT TYPE: NORMAL
CODING SYSTEM: NORMAL
DIFFERENTIAL METHOD: ABNORMAL
DIFFERENTIAL METHOD: ABNORMAL
DISPENSE STATUS: NORMAL
EOSINOPHIL # BLD AUTO: 0.1 K/UL
EOSINOPHIL # BLD AUTO: 0.2 K/UL
EOSINOPHIL NFR BLD: 0.7 %
EOSINOPHIL NFR BLD: 1 %
ERYTHROCYTE [DISTWIDTH] IN BLOOD BY AUTOMATED COUNT: 13.7 %
ERYTHROCYTE [DISTWIDTH] IN BLOOD BY AUTOMATED COUNT: 16.5 %
HCT VFR BLD AUTO: 13 %
HCT VFR BLD AUTO: 23.1 %
HGB BLD-MCNC: 4.3 G/DL
HGB BLD-MCNC: 7.7 G/DL
LYMPHOCYTES # BLD AUTO: 1.4 K/UL
LYMPHOCYTES # BLD AUTO: 1.9 K/UL
LYMPHOCYTES NFR BLD: 12 %
LYMPHOCYTES NFR BLD: 13.1 %
MCH RBC QN AUTO: 24.3 PG
MCH RBC QN AUTO: 26.3 PG
MCHC RBC AUTO-ENTMCNC: 33.1 G/DL
MCHC RBC AUTO-ENTMCNC: 33.3 G/DL
MCV RBC AUTO: 73 FL
MCV RBC AUTO: 79 FL
MONOCYTES # BLD AUTO: 1 K/UL
MONOCYTES # BLD AUTO: 1.2 K/UL
MONOCYTES NFR BLD: 8.3 %
MONOCYTES NFR BLD: 8.8 %
NEUTROPHILS # BLD AUTO: 11.2 K/UL
NEUTROPHILS # BLD AUTO: 9 K/UL
NEUTROPHILS NFR BLD: 77.4 %
NEUTROPHILS NFR BLD: 78.4 %
NUM UNITS TRANS PACKED RBC: NORMAL
PLATELET # BLD AUTO: 185 K/UL
PLATELET # BLD AUTO: 204 K/UL
PMV BLD AUTO: 9.3 FL
PMV BLD AUTO: 9.4 FL
RBC # BLD AUTO: 1.77 M/UL
RBC # BLD AUTO: 2.93 M/UL
WBC # BLD AUTO: 11.43 K/UL
WBC # BLD AUTO: 14.45 K/UL

## 2018-07-21 PROCEDURE — 25000003 PHARM REV CODE 250: Performed by: ADVANCED PRACTICE MIDWIFE

## 2018-07-21 PROCEDURE — P9016 RBC LEUKOCYTES REDUCED: HCPCS

## 2018-07-21 PROCEDURE — 36415 COLL VENOUS BLD VENIPUNCTURE: CPT

## 2018-07-21 PROCEDURE — 99232 SBSQ HOSP IP/OBS MODERATE 35: CPT | Mod: ,,, | Performed by: OBSTETRICS & GYNECOLOGY

## 2018-07-21 PROCEDURE — 36430 TRANSFUSION BLD/BLD COMPNT: CPT

## 2018-07-21 PROCEDURE — 85025 COMPLETE CBC W/AUTO DIFF WBC: CPT

## 2018-07-21 PROCEDURE — 11000001 HC ACUTE MED/SURG PRIVATE ROOM

## 2018-07-21 PROCEDURE — 25000003 PHARM REV CODE 250: Performed by: OBSTETRICS & GYNECOLOGY

## 2018-07-21 RX ORDER — HYDROCODONE BITARTRATE AND ACETAMINOPHEN 500; 5 MG/1; MG/1
TABLET ORAL
Status: DISCONTINUED | OUTPATIENT
Start: 2018-07-21 | End: 2018-07-22 | Stop reason: HOSPADM

## 2018-07-21 RX ORDER — OXYCODONE AND ACETAMINOPHEN 5; 325 MG/1; MG/1
1 TABLET ORAL EVERY 4 HOURS PRN
Status: DISCONTINUED | OUTPATIENT
Start: 2018-07-21 | End: 2018-07-22 | Stop reason: HOSPADM

## 2018-07-21 RX ORDER — OXYCODONE AND ACETAMINOPHEN 10; 325 MG/1; MG/1
1 TABLET ORAL EVERY 4 HOURS PRN
Status: DISCONTINUED | OUTPATIENT
Start: 2018-07-21 | End: 2018-07-22 | Stop reason: HOSPADM

## 2018-07-21 RX ADMIN — HYDROCODONE BITARTRATE AND ACETAMINOPHEN 1 TABLET: 10; 325 TABLET ORAL at 12:07

## 2018-07-21 RX ADMIN — DOCUSATE SODIUM 100 MG: 100 CAPSULE, LIQUID FILLED ORAL at 08:07

## 2018-07-21 RX ADMIN — SODIUM CHLORIDE: 0.9 INJECTION, SOLUTION INTRAVENOUS at 02:07

## 2018-07-21 RX ADMIN — IBUPROFEN 800 MG: 800 TABLET ORAL at 11:07

## 2018-07-21 RX ADMIN — IBUPROFEN 800 MG: 800 TABLET ORAL at 02:07

## 2018-07-21 RX ADMIN — IBUPROFEN 800 MG: 800 TABLET ORAL at 12:07

## 2018-07-21 RX ADMIN — HYDROCODONE BITARTRATE AND ACETAMINOPHEN 1 TABLET: 10; 325 TABLET ORAL at 05:07

## 2018-07-21 RX ADMIN — OXYCODONE HYDROCHLORIDE AND ACETAMINOPHEN 1 TABLET: 10; 325 TABLET ORAL at 08:07

## 2018-07-21 RX ADMIN — OXYCODONE HYDROCHLORIDE AND ACETAMINOPHEN 1 TABLET: 10; 325 TABLET ORAL at 03:07

## 2018-07-21 RX ADMIN — IBUPROFEN 800 MG: 800 TABLET ORAL at 05:07

## 2018-07-21 RX ADMIN — SIMETHICONE CHEW TAB 80 MG 80 MG: 80 TABLET ORAL at 11:07

## 2018-07-21 RX ADMIN — HYDROCODONE BITARTRATE AND ACETAMINOPHEN 1 TABLET: 10; 325 TABLET ORAL at 11:07

## 2018-07-21 RX ADMIN — SIMETHICONE CHEW TAB 80 MG 80 MG: 80 TABLET ORAL at 08:07

## 2018-07-21 NOTE — LACTATION NOTE
This note was copied from a baby's chart.  Called to room after parents requesting formula. Babies have been feeding at the breast and via syringe with EBM very well. Mom states she's very tired and can't feed the babies right now, thinks they need formula. Mom is also about to start blood transfusion process. Reviewed risks of supplementation. Discussed adequacy of colostrum. Instructed mother on normal  feeding and sleeping patterns. Encouraged mother to breastfeed infant a minimum of 8 times in 24 hours prior to supplementation to promote appropriate breast stimulation for adequate milk supply. Discussed with mother preferred alternative feeding methods, such as supplement infant at breast via SNS, syringe, spoon, or cup feeding. Discussed risks and encouraged mother to avoid artificial nipples and bottles. Mother chooses to supplement infant via bottle with nipple. Father given Formula Feeding Handout. Reviewed and discussed proper hand washing, expiration time of formula, position of nipple and bottle while feeding, baby led feeding and fullness cues. Father verbalized understanding and provided appropriate recall of all information.

## 2018-07-21 NOTE — PROGRESS NOTES
Called to pt room at 2220 saying she feels her heart is beating really fast. Vitals taken were T 99.7, /58, P 122 automatic and 121 radially, R 20, O2 98-99% on room air. pt also c/o lightheadeness and SOB whenever she first ambulates out of the bed, but once she sits back down it goes away. No other issues or symptoms.  2245-Dr. Geronimo notified; new orders for CBC draw.

## 2018-07-21 NOTE — LACTATION NOTE
This note was copied from a baby's chart.  Mom shown how to hand express, produces good amounts of colostrum. Mother taught how to safely feed infant via syringe. Demonstrated by nurse and mother return demonstrates proper and safe usage. Mother verbalized understanding and provided appropriate recall of all information. 3ml of EBM collected via syringe and fed to Baby B.

## 2018-07-21 NOTE — PLAN OF CARE
Problem: Patient Care Overview  Goal: Plan of Care Review  Outcome: Ongoing (interventions implemented as appropriate)  Pt progressing better. Tolerating blood transfusion well, no s/s of a reaction. Pain controlled with po meds. Fundus firm with scant to light lochia. Ambulating okay with minimal assistance. Voiding without difficulty. Aquacel dressing CDI without drainage. Family at bedside. Vitals stable. Will continue to monitor.

## 2018-07-21 NOTE — PROGRESS NOTES
Critical lab results called to Dr. Geronimo. Hgb 4.7 and Hct 14.2. Orders given to repeat CBC and if H/H is <6/24 may need blood. Will follow up

## 2018-07-21 NOTE — PLAN OF CARE
Problem: Patient Care Overview  Goal: Plan of Care Review  Outcome: Ongoing (interventions implemented as appropriate)    Infant bonding with mother/skin to skin contact. Breastfeeding/bottlefeeding well without difficulty. Mothers bleeding is minimal and she has no complaints at this time. VSS. No distress noted. Finished her 3rd unit of blood this morning and patient reports feeling much better. Pain meds changed to Percocet and patient states her pain is well controlled. Patient has also been ambulating in room without difficulty. Encouraged to call if any questions. Parent verbalized understanding of all teaching.

## 2018-07-21 NOTE — PROGRESS NOTES
Notified per RN of patients vital signs, H&H results and of patient being symptomatic due to low H&H.  Dr Geronimo notified of patient status and results. 3 units of PRBC's ordered.  Risks and benefits of transfusion discussed with patient and SO.  All questions were answered and patient has consented to transfusion.

## 2018-07-21 NOTE — SUBJECTIVE & OBJECTIVE
Hospital course: Admit, pt requests  section. Pt underwent uncomplicated primary LTCS. Both infants doing well on room air, in room w/ patient  18 0100 Notified per RN of low  H&H results and patient being symptomatic.  Results of H&H and patients symptoms reported to Ezekiel Geronimo.  Orders to transfuse 3 units PRBC's noted.    Interval History:   Patient doing well overall.  Vaginal bleeding within normal limits, but patient reports vaginal bleeding was very heavy following delivery.  Feeling weak and lightheaded with ambulation.  Tolerating regular diet.  Ambulating and voiding.    Objective:     Vital Signs (Most Recent):  Temp: 97.5 °F (36.4 °C) (18 1125)  Pulse: 104 (18 1125)  Resp: 20 (18 1125)  BP: 125/68 (18 1125)  SpO2: 99 % (18 2220) Vital Signs (24h Range):  Temp:  [97.4 °F (36.3 °C)-100 °F (37.8 °C)] 97.5 °F (36.4 °C)  Pulse:  [] 104  Resp:  [16-20] 20  SpO2:  [99 %] 99 %  BP: (101-135)/(51-86) 125/68     Weight: 97.5 kg (215 lb)  Body mass index is 29.99 kg/m².      Intake/Output Summary (Last 24 hours) at 18 1145  Last data filed at 18 1125   Gross per 24 hour   Intake          1335.41 ml   Output             2150 ml   Net          -814.59 ml       Significant Labs:  Lab Results   Component Value Date    GROUPTRH O POS 2018    HEPBSAG Negative 2018    STREPBCULT  2018     STREPTOCOCCUS AGALACTIAE (GROUP B)  Beta-hemolytic streptococci are routinely susceptible to   penicillins,cephalosporins and carbapenems.         Recent Labs  Lab 18  0000   HGB 4.3*   HCT 13.0*       I have personallly reviewed all pertinent lab results from the last 24 hours.    Physical Exam:   Constitutional: She is oriented to person, place, and time. She appears well-developed and well-nourished. No distress.      Neck: Neck supple.    Cardiovascular: Normal rate.     Pulmonary/Chest: Effort normal.        Abdominal: Soft. She exhibits no  distension. There is no tenderness.             Musculoskeletal: She exhibits no edema or tenderness.       Neurological: She is alert and oriented to person, place, and time.     Psychiatric: She has a normal mood and affect.

## 2018-07-21 NOTE — LACTATION NOTE
Lactation Rounds:     Visited mother at bedside. She was receiving blood transfusion at time of visit. Both infants asleep. Breastfeeding goals/plan discussed. She reported that she did not wish to attempt to breastfeed both twins at the same time during this visit, but she did request to start pumping. Her plans for feeding the twins are to breastfeed (unsure for how long) and also formula feed. Medela Symphony pump and pump parts already at bedside. Mother reported that she has not pumped yet. Breast pump setup initiated. Discussed the importance of pumping 8 or more times in a 24 hour period (including at night), hand expression, cleaning of pump parts, Medela Symphony pump settings, milk collection and storage. Verified appropriate flange fit (24 mm). Mother was distracted and intermittently using her phone throughout visit and may need reinforcement of teaching. She did not have any questions about pumping. She does not have a breast pump at home; discussed options for obtaining a breast pump. Encouraged mother to contact lactation with any questions or concerns or for observation of/assistance with next feeding.        07/21/18 1105   Maternal Infant Assessment   Breast Shape pendulous   Breast Density soft   Areola elastic   Nipple(s) everted   Equipment Type/Education   Pump Type Symphony   Breast Pump Type double electric, hospital grade   Breast Pump Flange Type hard   Breast Pump Flange Size 24 mm   Lactation Interventions   Attachment Promotion counseling provided;breastfeeding assistance provided   Breastfeeding Assistance milk expression/pumping;support offered   Maternal Breastfeeding Support encouragement offered;lactation counseling provided

## 2018-07-21 NOTE — PROGRESS NOTES
Repeat CBC with H/H of 4.3 and 13.0. Pt is symptomatic. Dr. Geronimo wants 3 units of RBCs transfused.

## 2018-07-21 NOTE — ASSESSMENT & PLAN NOTE
Patient receiving transfusion today of 3 units PRBC's.  Will continue to monitor CBC.  Blood loss appears to be related to surgical loss and PP bleeding.  No evidence of excessive blood loss at present time.

## 2018-07-21 NOTE — PROGRESS NOTES
[facility] -   Obstetrics  Postpartum Progress Note    Patient Name: Kumar Mcocy  MRN: [id number]  Admission Date: 2018  Hospital Length of Stay: 2 days  Attending Physician: Torie Carcamo MD  Primary Care Provider: Primary Doctor No    Subjective:     Principal Problem:S/P primary low transverse     Hospital course: Admit, pt requests  section. Pt underwent uncomplicated primary LTCS. Both infants doing well on room air, in room w/ patient  18 0100 Notified per RN of low  H&H results and patient being symptomatic.  Results of H&H and patients symptoms reported to [facility].  Orders to transfuse 3 units PRBC's noted.    Interval History:   Patient doing well overall.  Vaginal bleeding within normal limits, but patient reports vaginal bleeding was very heavy following delivery.  Feeling weak and lightheaded with ambulation.  Tolerating regular diet.  Ambulating and voiding.    Objective:     Vital Signs (Most Recent):  Temp: 97.5 °F (36.4 °C) (18 1125)  Pulse: 104 (18 1125)  Resp: 20 (18 1125)  BP: 125/68 (18 1125)  SpO2: 99 % (18 2220) Vital Signs (24h Range):  Temp:  [97.4 °F (36.3 °C)-100 °F (37.8 °C)] 97.5 °F (36.4 °C)  Pulse:  [] 104  Resp:  [16-20] 20  SpO2:  [99 %] 99 %  BP: (101-135)/(51-86) 125/68     Weight: 97.5 kg (215 lb)  Body mass index is 29.99 kg/m².      Intake/Output Summary (Last 24 hours) at 18 1145  Last data filed at 18 1125   Gross per 24 hour   Intake          1335.41 ml   Output             2150 ml   Net          -814.59 ml       Significant Labs:  Lab Results   Component Value Date    GROUPTRH O POS 2018    HEPBSAG Negative 2018    STREPBCULT  2018     STREPTOCOCCUS AGALACTIAE (GROUP B)  Beta-hemolytic streptococci are routinely susceptible to   penicillins,cephalosporins and carbapenems.         Recent Labs  Lab 18  0000   HGB 4.3*   HCT 13.0*       I have personallly reviewed  all pertinent lab results from the last 24 hours.    Physical Exam:   Constitutional: She is oriented to person, place, and time. She appears well-developed and well-nourished. No distress.      Neck: Neck supple.    Cardiovascular: Normal rate.     Pulmonary/Chest: Effort normal.        Abdominal: Soft. She exhibits no distension. There is no tenderness.             Musculoskeletal: She exhibits no edema or tenderness.       Neurological: She is alert and oriented to person, place, and time.     Psychiatric: She has a normal mood and affect.       Assessment/Plan:     18 y.o. female  for:    * S/P primary low transverse     POD#2 s/p  - stable, but currently receiving blood transfusion for symptomatic anemia.  Anticipate discharge to home tomorrow.        Anemia associated with acute blood loss    Patient receiving transfusion today of 3 units PRBC's.  Will continue to monitor CBC.  Blood loss appears to be related to surgical loss and PP bleeding.  No evidence of excessive blood loss at present time.            Disposition: As patient meets milestones, will plan to discharge.    Amalia Yoon MD  Obstetrics  [facility] -

## 2018-07-21 NOTE — ASSESSMENT & PLAN NOTE
POD#2 s/p  - stable, but currently receiving blood transfusion for symptomatic anemia.  Anticipate discharge to home tomorrow.

## 2018-07-22 VITALS
DIASTOLIC BLOOD PRESSURE: 63 MMHG | BODY MASS INDEX: 30.1 KG/M2 | SYSTOLIC BLOOD PRESSURE: 115 MMHG | HEART RATE: 94 BPM | WEIGHT: 215 LBS | TEMPERATURE: 99 F | HEIGHT: 71 IN | RESPIRATION RATE: 20 BRPM | OXYGEN SATURATION: 99 %

## 2018-07-22 PROBLEM — O99.820 GBS (GROUP B STREPTOCOCCUS CARRIER), +RV CULTURE, CURRENTLY PREGNANT: Status: RESOLVED | Noted: 2018-07-19 | Resolved: 2018-07-22

## 2018-07-22 PROBLEM — Z98.891 S/P PRIMARY LOW TRANSVERSE C-SECTION: Status: RESOLVED | Noted: 2018-07-19 | Resolved: 2018-07-22

## 2018-07-22 LAB
BASOPHILS # BLD AUTO: 0.03 K/UL
BASOPHILS NFR BLD: 0.2 %
DIFFERENTIAL METHOD: ABNORMAL
EOSINOPHIL # BLD AUTO: 0.3 K/UL
EOSINOPHIL NFR BLD: 2.3 %
ERYTHROCYTE [DISTWIDTH] IN BLOOD BY AUTOMATED COUNT: 16.6 %
HCT VFR BLD AUTO: 24.3 %
HGB BLD-MCNC: 8.1 G/DL
LYMPHOCYTES # BLD AUTO: 2.6 K/UL
LYMPHOCYTES NFR BLD: 18.5 %
MCH RBC QN AUTO: 26.3 PG
MCHC RBC AUTO-ENTMCNC: 33.3 G/DL
MCV RBC AUTO: 79 FL
MONOCYTES # BLD AUTO: 1 K/UL
MONOCYTES NFR BLD: 6.7 %
NEUTROPHILS # BLD AUTO: 10.2 K/UL
NEUTROPHILS NFR BLD: 72.3 %
PLATELET # BLD AUTO: 208 K/UL
PMV BLD AUTO: 9.1 FL
RBC # BLD AUTO: 3.08 M/UL
WBC # BLD AUTO: 14.14 K/UL

## 2018-07-22 PROCEDURE — 85025 COMPLETE CBC W/AUTO DIFF WBC: CPT

## 2018-07-22 PROCEDURE — 36415 COLL VENOUS BLD VENIPUNCTURE: CPT

## 2018-07-22 PROCEDURE — 25000003 PHARM REV CODE 250: Performed by: OBSTETRICS & GYNECOLOGY

## 2018-07-22 PROCEDURE — 99238 HOSP IP/OBS DSCHRG MGMT 30/<: CPT | Mod: ,,, | Performed by: OBSTETRICS & GYNECOLOGY

## 2018-07-22 RX ORDER — OXYCODONE AND ACETAMINOPHEN 5; 325 MG/1; MG/1
1 TABLET ORAL EVERY 6 HOURS PRN
Qty: 20 TABLET | Refills: 0 | Status: SHIPPED | OUTPATIENT
Start: 2018-07-22 | End: 2018-08-03

## 2018-07-22 RX ORDER — IBUPROFEN 800 MG/1
800 TABLET ORAL EVERY 8 HOURS
Qty: 30 TABLET | Refills: 0 | Status: SHIPPED | OUTPATIENT
Start: 2018-07-22 | End: 2018-08-03

## 2018-07-22 RX ADMIN — DOCUSATE SODIUM 100 MG: 100 CAPSULE, LIQUID FILLED ORAL at 08:07

## 2018-07-22 RX ADMIN — IBUPROFEN 800 MG: 800 TABLET ORAL at 06:07

## 2018-07-22 RX ADMIN — OXYCODONE HYDROCHLORIDE AND ACETAMINOPHEN 1 TABLET: 10; 325 TABLET ORAL at 02:07

## 2018-07-22 NOTE — LACTATION NOTE
Lactation Rounds:     Visited mother at bedside.She reported that she will be purchasing a hand pump for use at home. She also plans to continue latching her babies and bottle feeding formula. She is comfortable with her plan. Discharge teaching done with mother, including expectations for feeding and output, engorgement, diet/medications ([facility]), support groups/warmline, etc. Mother verbalized her understanding of teaching. She would like to be connected with a peer counselor at [facility]. [facility] Peer Counselor Referral sheet completed and faxed.

## 2018-07-22 NOTE — DISCHARGE INSTRUCTIONS

## 2018-07-22 NOTE — PROGRESS NOTES
[facility] -   Obstetrics  Postpartum Progress Note    Patient Name: Kumar Mccoy  MRN: [id number]  Admission Date: 2018  Hospital Length of Stay: 3 days  Attending Physician: Torie Carcamo MD  Primary Care Provider: Primary Doctor No    Subjective:     Principal Problem:S/P     Hospital course: Admit, pt requests  section. Pt underwent uncomplicated primary LTCS. Both infants doing well on room air, in room w/ patient  18 0100 Notified per RN of low  H&H results and patient being symptomatic.  Results of H&H and patients symptoms reported to [facility].  Orders to transfuse 3 units PRBC's noted.  18 - patient doing well and is asymptomatic..  H/H  s/p transfusion.  Stable for discharge to home.    Interval History:   Patient doing well today.  No complaints.  Pain controlled on oral meds.  Ambulating, voiding, and tolerating po.  Vaginal bleeding within normal limits.     Objective:     Vital Signs (Most Recent):  Temp: 98.3 °F (36.8 °C) (18 0800)  Pulse: 90 (18 0800)  Resp: 20 (18 0800)  BP: 129/85 (18 0800)  SpO2: 99 % (18 2220) Vital Signs (24h Range):  Temp:  [97.5 °F (36.4 °C)-98.9 °F (37.2 °C)] 98.3 °F (36.8 °C)  Pulse:  [] 90  Resp:  [16-20] 20  BP: (112-130)/(56-85) 129/85     Weight: 97.5 kg (215 lb)  Body mass index is 29.99 kg/m².      Intake/Output Summary (Last 24 hours) at 18 1002  Last data filed at 18 1125   Gross per 24 hour   Intake           333.33 ml   Output                0 ml   Net           333.33 ml       Significant Labs:  Lab Results   Component Value Date    GROUPTRH O POS 2018    HEPBSAG Negative 2018    STREPBCULT  2018     STREPTOCOCCUS AGALACTIAE (GROUP B)  Beta-hemolytic streptococci are routinely susceptible to   penicillins,cephalosporins and carbapenems.         Recent Labs  Lab 18  0546   HGB 8.1*   HCT 24.3*       I have personallly reviewed all pertinent lab  results from the last 24 hours.    Physical Exam:   Constitutional: She is oriented to person, place, and time. She appears well-developed and well-nourished. No distress.      Neck: Neck supple.    Cardiovascular: Normal rate.     Pulmonary/Chest: Effort normal.        Abdominal: Soft. She exhibits no distension. There is no tenderness.             Musculoskeletal: She exhibits no edema or tenderness.       Neurological: She is alert and oriented to person, place, and time.     Psychiatric: She has a normal mood and affect.       Assessment/Plan:     18 y.o. female  for:    * S/P     POD#3 s/p  - stable s/p transfusion  Stable for discharge to home        Anemia associated with acute blood loss    Patient s/p transfusion - stable.            Disposition:  Discharge to home.    Amalia Yoon MD  Obstetrics  [facility] -

## 2018-07-22 NOTE — SUBJECTIVE & OBJECTIVE
Hospital course: Admit, pt requests  section. Pt underwent uncomplicated primary LTCS. Both infants doing well on room air, in room w/ patient  18 0100 Notified per RN of low  H&H results and patient being symptomatic.  Results of H&H and patients symptoms reported to [facility]ke[facility] Geronimo.  Orders to transfuse 3 units PRBC's noted.  18 - patient doing well and is asymptomatic..  H/H  s/p transfusion.  Stable for discharge to home.    Interval History:   Patient doing well today.  No complaints.  Pain controlled on oral meds.  Ambulating, voiding, and tolerating po.  Vaginal bleeding within normal limits.     Objective:     Vital Signs (Most Recent):  Temp: 98.3 °F (36.8 °C) (18 0800)  Pulse: 90 (18 0800)  Resp: 20 (18 0800)  BP: 129/85 (18 0800)  SpO2: 99 % (18 2220) Vital Signs (24h Range):  Temp:  [97.5 °F (36.4 °C)-98.9 °F (37.2 °C)] 98.3 °F (36.8 °C)  Pulse:  [] 90  Resp:  [16-20] 20  BP: (112-130)/(56-85) 129/85     Weight: 97.5 kg (215 lb)  Body mass index is 29.99 kg/m².      Intake/Output Summary (Last 24 hours) at 18 1002  Last data filed at 18 1125   Gross per 24 hour   Intake           333.33 ml   Output                0 ml   Net           333.33 ml       Significant Labs:  Lab Results   Component Value Date    GROUPTRH O POS 2018    HEPBSAG Negative 2018    STREPBCULT  2018     STREPTOCOCCUS AGALACTIAE (GROUP B)  Beta-hemolytic streptococci are routinely susceptible to   penicillins,cephalosporins and carbapenems.         Recent Labs  Lab 18  0546   HGB 8.1*   HCT 24.3*       I have personallly reviewed all pertinent lab results from the last 24 hours.    Physical Exam:   Constitutional: She is oriented to person, place, and time. She appears well-developed and well-nourished. No distress.      Neck: Neck supple.    Cardiovascular: Normal rate.     Pulmonary/Chest: Effort normal.        Abdominal: Soft. She exhibits no  distension. There is no tenderness.             Musculoskeletal: She exhibits no edema or tenderness.       Neurological: She is alert and oriented to person, place, and time.     Psychiatric: She has a normal mood and affect.

## 2018-07-22 NOTE — PLAN OF CARE
Problem: Patient Care Overview  Goal: Plan of Care Review  Outcome: Ongoing (interventions implemented as appropriate)  Pt progressing well. Up ad henrietta. Pain controlled with oral pain medication. Voids spontaneously without difficulty. Fundus firm without massage. Aquacel dressing dry and intact. Bleeding light. Breast/Formula feeding. Bonding appropriately with baby. Will continue to monitor, VSS.

## 2018-07-22 NOTE — DISCHARGE SUMMARY
[facility] -   Obstetrics  Discharge Summary      Patient Name: Kumar Mccoy  MRN: [id number]  Admission Date: 2018  Hospital Length of Stay: 3 days  Discharge Date and Time:  2018 11:15 AM  Attending Physician: Torie Carcamo MD   Discharging Provider: Amalia Yoon MD  Primary Care Provider: Primary Doctor No    HPI: Presents from clinic with IUGR baby A, 3%, BPP 2/8, and Baby B 4%    Procedure(s) (LRB):   SECTION (N/A)     Hospital Course:   Admit, pt requests  section. Pt underwent uncomplicated primary LTCS. Both infants doing well on room air, in room w/ patient  18 0100 Notified per RN of low  H&H results and patient being symptomatic.  Results of H&H and patients symptoms reported to [facility].  Orders to transfuse 3 units PRBC's noted.  18 - patient doing well and is asymptomatic..  H/H  s/p transfusion.  Stable for discharge to home.    Consults         Status Ordering Provider     Consult to Lactation  Use PRN     Provider:  (Not yet assigned)    Acknowledged AMALIA YOON          Final Active Diagnoses:    Diagnosis Date Noted POA    PRINCIPAL PROBLEM:  S/P  [Z98.891] 2018 Not Applicable    Anemia associated with acute blood loss [D62] 2018 Unknown    IUGR (intrauterine growth restriction) affecting care of mother [O36.5990] 2018 Yes      Problems Resolved During this Admission:    Diagnosis Date Noted Date Resolved POA    IUGR (intrauterine growth restriction) affecting care of mother, third trimester, fetus 1 [O36.5931] 2018 Yes    IUGR (intrauterine growth restriction) affecting care of mother, third trimester, fetus 2 [O36.5932] 2018 Yes    GBS (group B Streptococcus carrier), +RV culture, currently pregnant [O99.820] 2018 Not Applicable        Labs: All labs within the past 24 hours have been reviewed    Feeding Method: both breast and  bottle    Immunizations     Date Immunization Status Dose Route/Site Given by    18 MMR Incomplete 0.5 mL Subcutaneous/Left deltoid     18 Tdap Incomplete 0.5 mL Intramuscular/Left deltoid              Tahir Mccoy [[id number]]     Delivery:    Episiotomy:     Lacerations:     Repair suture:     Repair # of packets:     Blood loss (ml): 0     Birth information:  YOB: 2020   Time of birth: 5:19 PM   Sex: male   Delivery type: , Low Transverse   Gestational Age: 37w4d    Delivery Clinician:      Other providers:       Additional  information:  Forceps:    Vacuum:    Breech:    Observed anomalies      Living?:           APGARS  One minute Five minutes Ten minutes   Skin color:         Heart rate:         Grimace:         Muscle tone:         Breathing:         Totals: 8  8        Placenta: Delivered:       appearance     ALEXANDER Mccoy [[id number]]     Delivery:    Episiotomy:     Lacerations:     Repair suture:     Repair # of packets:     Blood loss (ml): 0     Birth information:  YOB: 2020   Time of birth: 5:21 PM   Sex: male   Delivery type: , Low Transverse   Gestational Age: 37w4d    Delivery Clinician:      Other providers:       Additional  information:  Forceps:    Vacuum:    Breech:    Observed anomalies      Living?:           APGARS  One minute Five minutes Ten minutes   Skin color:         Heart rate:         Grimace:         Muscle tone:         Breathing:         Totals: 8  9        Placenta: Delivered:       appearance    Pending Diagnostic Studies:     None          Discharged Condition: stable    Disposition: Home or Self Care    Follow Up:  Follow-up Information     OB GYN NURSE, TRAVIS In 1 week.    Why:  Removal of wound dressing           Amalia Yoon MD In 4 weeks.    Specialties:  Obstetrics, Obstetrics and Gynecology  Why:  Postpartum Check  Contact information:  [address] [place] [address] DR Robbi VELAZQUEZ  [id number]  [phone]                 Patient Instructions:     Call MD for:  temperature >100.4     Call MD for:  persistent nausea and vomiting or diarrhea     Call MD for:  severe uncontrolled pain     Call MD for:  redness, tenderness, or signs of infection (pain, swelling, redness, odor or green/yellow discharge around incision site)     Call MD for:  difficulty breathing or increased cough     Call MD for:  severe persistent headache     Call MD for:  persistent dizziness, light-headedness, or visual disturbances     Leave dressing on - Keep it clean, dry, and intact until clinic visit       Medications:  Current Discharge Medication List      START taking these medications    Details   ibuprofen (ADVIL,MOTRIN) 800 MG tablet Take 1 tablet (800 mg total) by mouth every 8 (eight) hours.  Qty: 30 tablet, Refills: 0      iron polysacch complex-b12-fa 150-25-1 mg-mcg-mg (FERREX FORTE) 150-25-1 mg-mcg-mg Cap Take 1 capsule by mouth once daily.  Qty: 30 capsule, Refills: 6      oxyCODONE-acetaminophen (PERCOCET) 5-325 mg per tablet Take 1 tablet by mouth every 6 (six) hours as needed for Pain.  Qty: 20 tablet, Refills: 0         CONTINUE these medications which have NOT CHANGED    Details   diphenhydramine-acetaminophen (TYLENOL PM)  mg Tab Take 1 tablet by mouth as needed.             Amalia Yoon MD  Obstetrics  [facility] -

## 2018-07-22 NOTE — LACTATION NOTE
Visited mother at bedside. She reported that she is not getting much with pumping; reviewed expectations. Mother stated that she preferred to use a hand pump with her previous child and that she plans to buy and use a hand pump for the twins. Mother reported that breastfeeding twins individually is going well but that she has not yet been able to feed them both at the same time successfully. Encouraged her to call lactation with next attempt in order to work on this. She verbalized her understanding.

## 2018-07-22 NOTE — LACTATION NOTE
Visited mother for breastfeeding rounds.   Mother, father, and one of the twins are sleeping in the bed.   I took the baby and placed him safely in the crib.   SIDS precaution will need to be reviewed with parents

## 2018-07-25 ENCOUNTER — TELEPHONE (OUTPATIENT)
Dept: LACTATION | Facility: CLINIC | Age: 18
End: 2018-07-25

## 2018-07-25 NOTE — TELEPHONE ENCOUNTER
Follow up lactation phone call. Mother states things are going well. Experiencing milk engorgement, engorgement measures reviewed. Mother was pumping before and after breastfeeding sessions; instructed mother to pump for comfort after nursing sessions and to discontinue pumping before breastfeeding sessions. Mother reports mild difficulty latching Yash (Baby B) to the right breast, suggested position change. Mother to call for further assistance, within 72 hours, if engorgement is not resolved and/or if Yash is not latching well. Reviewed warmline resource, mother to call for any needed future assistance.

## 2018-07-26 ENCOUNTER — CLINICAL SUPPORT (OUTPATIENT)
Dept: OBSTETRICS AND GYNECOLOGY | Facility: CLINIC | Age: 18
End: 2018-07-26
Payer: MEDICAID

## 2018-07-26 DIAGNOSIS — Z48.01 DRESSING CHANGE OR REMOVAL, SURGICAL WOUND: Primary | ICD-10-CM

## 2018-07-31 ENCOUNTER — TELEPHONE (OUTPATIENT)
Dept: OBSTETRICS AND GYNECOLOGY | Facility: CLINIC | Age: 18
End: 2018-07-31

## 2018-07-31 NOTE — TELEPHONE ENCOUNTER
----- Message from Mary Ann Montoya sent at 7/31/2018 11:07 AM CDT -----  Contact: pt  Please call pt @ [phone], pt states she need a PA sent to [facility] for pt Iron medication, pt states she is breast feeding and need the iron medication.

## 2018-08-03 ENCOUNTER — HOSPITAL ENCOUNTER (EMERGENCY)
Facility: HOSPITAL | Age: 18
Discharge: HOME | End: 2018-08-03
Attending: EMERGENCY MEDICINE
Payer: MEDICAID

## 2018-08-03 VITALS
HEIGHT: 69 IN | DIASTOLIC BLOOD PRESSURE: 88 MMHG | WEIGHT: 194 LBS | TEMPERATURE: 99 F | RESPIRATION RATE: 14 BRPM | BODY MASS INDEX: 28.73 KG/M2 | SYSTOLIC BLOOD PRESSURE: 140 MMHG | HEART RATE: 68 BPM | OXYGEN SATURATION: 99 %

## 2018-08-03 DIAGNOSIS — N93.9 VAGINAL BLEEDING: ICD-10-CM

## 2018-08-03 DIAGNOSIS — R10.30 LOWER ABDOMINAL PAIN: ICD-10-CM

## 2018-08-03 DIAGNOSIS — S30.1XXA HEMATOMA OF ABDOMINAL WALL, INITIAL ENCOUNTER: Primary | ICD-10-CM

## 2018-08-03 PROBLEM — O09.33 NO PRENATAL CARE IN CURRENT PREGNANCY IN THIRD TRIMESTER: Status: RESOLVED | Noted: 2018-06-20 | Resolved: 2018-08-03

## 2018-08-03 PROBLEM — O47.03 THREATENED PREMATURE LABOR IN THIRD TRIMESTER: Status: RESOLVED | Noted: 2018-07-10 | Resolved: 2018-08-03

## 2018-08-03 LAB
ALBUMIN SERPL BCP-MCNC: 3.2 G/DL
ALP SERPL-CCNC: 112 U/L
ALT SERPL W/O P-5'-P-CCNC: 12 U/L
ANION GAP SERPL CALC-SCNC: 9 MMOL/L
ANION GAP SERPL CALC-SCNC: 9 MMOL/L
AST SERPL-CCNC: 19 U/L
BASOPHILS # BLD AUTO: 0.03 K/UL
BASOPHILS NFR BLD: 0.3 %
BILIRUB SERPL-MCNC: 0.4 MG/DL
BUN SERPL-MCNC: 14 MG/DL
BUN SERPL-MCNC: 14 MG/DL
CALCIUM SERPL-MCNC: 8.4 MG/DL
CALCIUM SERPL-MCNC: 8.4 MG/DL
CHLORIDE SERPL-SCNC: 106 MMOL/L
CHLORIDE SERPL-SCNC: 106 MMOL/L
CO2 SERPL-SCNC: 24 MMOL/L
CO2 SERPL-SCNC: 24 MMOL/L
CREAT SERPL-MCNC: 0.7 MG/DL
CREAT SERPL-MCNC: 0.7 MG/DL
DIFFERENTIAL METHOD: ABNORMAL
EOSINOPHIL # BLD AUTO: 0.2 K/UL
EOSINOPHIL NFR BLD: 2.6 %
ERYTHROCYTE [DISTWIDTH] IN BLOOD BY AUTOMATED COUNT: 17.1 %
EST. GFR  (AFRICAN AMERICAN): >60 ML/MIN/1.73 M^2
EST. GFR  (AFRICAN AMERICAN): >60 ML/MIN/1.73 M^2
EST. GFR  (NON AFRICAN AMERICAN): >60 ML/MIN/1.73 M^2
EST. GFR  (NON AFRICAN AMERICAN): >60 ML/MIN/1.73 M^2
GLUCOSE SERPL-MCNC: 99 MG/DL
GLUCOSE SERPL-MCNC: 99 MG/DL
HCT VFR BLD AUTO: 29.4 %
HGB BLD-MCNC: 9.6 G/DL
LYMPHOCYTES # BLD AUTO: 2.2 K/UL
LYMPHOCYTES NFR BLD: 24.8 %
MAGNESIUM SERPL-MCNC: 2 MG/DL
MCH RBC QN AUTO: 25.9 PG
MCHC RBC AUTO-ENTMCNC: 32.7 G/DL
MCV RBC AUTO: 79 FL
MONOCYTES # BLD AUTO: 0.5 K/UL
MONOCYTES NFR BLD: 5.7 %
NEUTROPHILS # BLD AUTO: 5.9 K/UL
NEUTROPHILS NFR BLD: 66.6 %
PLATELET # BLD AUTO: 354 K/UL
PMV BLD AUTO: 8.9 FL
POTASSIUM SERPL-SCNC: 3.3 MMOL/L
POTASSIUM SERPL-SCNC: 3.3 MMOL/L
PROT SERPL-MCNC: 6.8 G/DL
RBC # BLD AUTO: 3.71 M/UL
SODIUM SERPL-SCNC: 139 MMOL/L
SODIUM SERPL-SCNC: 139 MMOL/L
TROPONIN I SERPL DL<=0.01 NG/ML-MCNC: <0.006 NG/ML
WBC # BLD AUTO: 8.79 K/UL

## 2018-08-03 PROCEDURE — 63600175 PHARM REV CODE 636 W HCPCS: Performed by: SPECIALIST

## 2018-08-03 PROCEDURE — 84484 ASSAY OF TROPONIN QUANT: CPT

## 2018-08-03 PROCEDURE — 80053 COMPREHEN METABOLIC PANEL: CPT

## 2018-08-03 PROCEDURE — 96365 THER/PROPH/DIAG IV INF INIT: CPT

## 2018-08-03 PROCEDURE — 25000003 PHARM REV CODE 250: Performed by: SPECIALIST

## 2018-08-03 PROCEDURE — 99285 EMERGENCY DEPT VISIT HI MDM: CPT | Mod: 25

## 2018-08-03 PROCEDURE — 25500020 PHARM REV CODE 255: Performed by: EMERGENCY MEDICINE

## 2018-08-03 PROCEDURE — 83735 ASSAY OF MAGNESIUM: CPT

## 2018-08-03 PROCEDURE — 85025 COMPLETE CBC W/AUTO DIFF WBC: CPT

## 2018-08-03 RX ORDER — OXYCODONE AND ACETAMINOPHEN 5; 325 MG/1; MG/1
1 TABLET ORAL EVERY 6 HOURS PRN
Qty: 20 TABLET | Refills: 0 | Status: SHIPPED | OUTPATIENT
Start: 2018-08-03 | End: 2018-08-16 | Stop reason: SDUPTHER

## 2018-08-03 RX ORDER — AMOXICILLIN AND CLAVULANATE POTASSIUM 875; 125 MG/1; MG/1
1 TABLET, FILM COATED ORAL 2 TIMES DAILY
Qty: 20 TABLET | Refills: 0 | Status: SHIPPED | OUTPATIENT
Start: 2018-08-03 | End: 2018-08-13

## 2018-08-03 RX ORDER — IBUPROFEN 800 MG/1
800 TABLET ORAL EVERY 8 HOURS
Qty: 30 TABLET | Refills: 1 | Status: SHIPPED | OUTPATIENT
Start: 2018-08-03 | End: 2018-08-16 | Stop reason: SDUPTHER

## 2018-08-03 RX ORDER — KETOROLAC TROMETHAMINE 30 MG/ML
30 INJECTION, SOLUTION INTRAMUSCULAR; INTRAVENOUS
Status: DISCONTINUED | OUTPATIENT
Start: 2018-08-03 | End: 2018-08-03 | Stop reason: HOSPADM

## 2018-08-03 RX ADMIN — PIPERACILLIN AND TAZOBACTAM 4.5 G: 4; .5 INJECTION, POWDER, LYOPHILIZED, FOR SOLUTION INTRAVENOUS; PARENTERAL at 07:08

## 2018-08-03 RX ADMIN — IOHEXOL 75 ML: 350 INJECTION, SOLUTION INTRAVENOUS at 05:08

## 2018-08-03 NOTE — SUBJECTIVE & OBJECTIVE
Obstetric History       T2      L3     SAB0   TAB0   Ectopic0   Multiple1   Live Births3       # Outcome Date GA Lbr Peyman/2nd Weight Sex Delivery Anes PTL Lv   2A Term 18 37w4d  2.56 kg (5 lb 10.3 oz) M CS-LTranv Spinal  HARSHAL      Name: GILMA LOGAN      Apgar1:  8                Apgar5: 8   2B Term 18 37w4d  2.51 kg (5 lb 8.5 oz) M CS-LTranv Spinal  HARSHAL      Name: ALEXANDER LOGAN      Apgar1:  8                Apgar5: 9   1 Term 16 39w5d  3.005 kg (6 lb 10 oz) M Vag-Spont EPI N HARSHAL      Complications: PIH (pregnancy induced hypertension)        Past Medical History:   Diagnosis Date    Anemia     Hypertension      Past Surgical History:   Procedure Laterality Date     SECTION N/A 2018    Procedure:  SECTION;  Surgeon: Amalia Yoon MD;  Location: [facility] L&D;  Service: OB/GYN;  Laterality: N/A;    I&D LEFT ARM      TONSILLECTOMY, ADENOIDECTOMY, BILATERAL MYRINGOTOMY AND TUBES           (Not in a hospital admission)    Review of patient's allergies indicates:  No Known Allergies     Family History     Problem Relation (Age of Onset)    Cancer Maternal Grandmother    Diabetes Maternal Grandfather        Social History Main Topics    Smoking status: Never Smoker    Smokeless tobacco: Never Used    Alcohol use No    Drug use: No    Sexual activity: Yes     Partners: Male     Review of Systems   Constitutional: Negative for chills and fever.   Respiratory: Negative for cough and shortness of breath.    Cardiovascular: Negative for chest pain.   Gastrointestinal: Positive for abdominal pain. Negative for nausea and vomiting.   Genitourinary: Positive for pelvic pain and vaginal bleeding. Negative for dysuria.      Objective:     Vital Signs (Most Recent):  Temp: 98.7 °F (37.1 °C) (18)  Pulse: 60 (18)  Resp: 18 (18)  BP: (!) 162/98 (18)  SpO2: 99 % (18) Vital Signs (24h Range):  Temp:   [98.6 °F (37 °C)-98.7 °F (37.1 °C)] 98.7 °F (37.1 °C)  Pulse:  [60-86] 60  Resp:  [18] 18  SpO2:  [99 %-100 %] 99 %  BP: (162-170)/(95-99) 162/98     Weight: 88 kg (194 lb)  Body mass index is 28.65 kg/m².    No LMP recorded.    Physical Exam:   Constitutional: She is oriented to person, place, and time. She appears well-developed and well-nourished. No distress.      Neck: Neck supple.    Cardiovascular: Normal rate.     Pulmonary/Chest: Effort normal.        Abdominal: Soft. She exhibits no distension. There is tenderness.   Hematoma palpable in subcutaneous tissue at left aspect of incision.  Incision intact with no drainage.  No erythema or signs of infection.             Musculoskeletal: She exhibits no edema or tenderness.       Neurological: She is alert and oriented to person, place, and time.     Psychiatric: She has a normal mood and affect.       Laboratory:  I have personallly reviewed all pertinent lab results from the last 24 hours.    Diagnostic Results:  Labs: Reviewed  CT scan of abdomen shows hematoma over rectus muscles.

## 2018-08-03 NOTE — ASSESSMENT & PLAN NOTE
IV Zosyn given in ED.  Patient stable for discharge to home with po Augmentin x 10 days and pain meds.  Patient to f/u in office in 1 week for wound check.  Patient advised to f/u sooner for any fever or signs of infection.

## 2018-08-03 NOTE — HPI
18 y.o. female  s/p  on  for twins.  Patient presents to ED with c/o increased lower abdominal pain and vaginal bleeding over past several days.  No fever or chills.  No N/V.  Normla appetite.  She is breastfeeding.

## 2018-08-03 NOTE — CONSULTS
[facility] -   Obstetrics & Gynecology  Consult Note    Patient Name: Kumar Logan  MRN: [id number]  Admission Date: 8/3/2018  Hospital Length of Stay: 0 days  Code Status: Prior  Primary Care Provider: Primary Doctor No  Principal Problem: Abdominal wall hematoma    Consults  Subjective:     Chief Complaint:  Abdominal pain and vaginal bleeding.    History of Present Illness:  18 y.o. female  s/p  on  for twins.  Patient presents to ED with c/o increased lower abdominal pain and vaginal bleeding over past several days.  No fever or chills.  No N/V.  Normla appetite.  She is breastfeeding.          Obstetric History       T2      L3     SAB0   TAB0   Ectopic0   Multiple1   Live Births3       # Outcome Date GA Lbr Peyman/2nd Weight Sex Delivery Anes PTL Lv   2A Term 18 37w4d  2.56 kg (5 lb 10.3 oz) M CS-LTranv Spinal  HARSHAL      Name: GILMA LOGAN      Apgar1:  8                Apgar5: 8   2B Term 18 37w4d  2.51 kg (5 lb 8.5 oz) M CS-LTranv Spinal  HARSHAL      Name: ALEXANDER LOGAN      Apgar1:  8                Apgar5: 9   1 Term 16 39w5d  3.005 kg (6 lb 10 oz) M Vag-Spont EPI N HARSHAL      Complications: PIH (pregnancy induced hypertension)        Past Medical History:   Diagnosis Date    Anemia     Hypertension      Past Surgical History:   Procedure Laterality Date     SECTION N/A 2018    Procedure:  SECTION;  Surgeon: Amalia Yoon MD;  Location: [facility] L&D;  Service: OB/GYN;  Laterality: N/A;    I&D LEFT ARM      TONSILLECTOMY, ADENOIDECTOMY, BILATERAL MYRINGOTOMY AND TUBES           (Not in a hospital admission)    Review of patient's allergies indicates:  No Known Allergies     Family History     Problem Relation (Age of Onset)    Cancer Maternal Grandmother    Diabetes Maternal Grandfather        Social History Main Topics    Smoking status: Never Smoker    Smokeless tobacco: Never Used    Alcohol use No     Drug use: No    Sexual activity: Yes     Partners: Male     Review of Systems   Constitutional: Negative for chills and fever.   Respiratory: Negative for cough and shortness of breath.    Cardiovascular: Negative for chest pain.   Gastrointestinal: Positive for abdominal pain. Negative for nausea and vomiting.   Genitourinary: Positive for pelvic pain and vaginal bleeding. Negative for dysuria.      Objective:     Vital Signs (Most Recent):  Temp: 98.7 °F (37.1 °C) (08/03/18 0558)  Pulse: 60 (08/03/18 0558)  Resp: 18 (08/03/18 0558)  BP: (!) 162/98 (08/03/18 0558)  SpO2: 99 % (08/03/18 0558) Vital Signs (24h Range):  Temp:  [98.6 °F (37 °C)-98.7 °F (37.1 °C)] 98.7 °F (37.1 °C)  Pulse:  [60-86] 60  Resp:  [18] 18  SpO2:  [99 %-100 %] 99 %  BP: (162-170)/(95-99) 162/98     Weight: 88 kg (194 lb)  Body mass index is 28.65 kg/m².    No LMP recorded.    Physical Exam:   Constitutional: She is oriented to person, place, and time. She appears well-developed and well-nourished. No distress.      Neck: Neck supple.    Cardiovascular: Normal rate.     Pulmonary/Chest: Effort normal.        Abdominal: Soft. She exhibits no distension. There is tenderness.   Hematoma palpable in subcutaneous tissue at left aspect of incision.  Incision intact with no drainage.  No erythema or signs of infection.             Musculoskeletal: She exhibits no edema or tenderness.       Neurological: She is alert and oriented to person, place, and time.     Psychiatric: She has a normal mood and affect.       Laboratory:  I have personallly reviewed all pertinent lab results from the last 24 hours.    Diagnostic Results:  Labs: Reviewed  CT scan of abdomen shows hematoma over rectus muscles.    Lab Results   Component Value Date    WBC 8.79 08/03/2018    HGB 9.6 (L) 08/03/2018    HCT 29.4 (L) 08/03/2018    MCV 79 (L) 08/03/2018     (H) 08/03/2018     Assessment/Plan:     * Abdominal wall hematoma    IV Zosyn given in ED.  Patient stable  for discharge to home with po Augmentin x 10 days and pain meds.  Patient to f/u in office in 1 week for wound check.  Patient advised to f/u sooner for any fever or signs of infection.            Thank you for your consult. I will follow-up with patient. Please contact us if you have any additional questions.    Amalia Yoon MD  Obstetrics & Gynecology  [facility] -

## 2018-08-03 NOTE — ED PROVIDER NOTES
"SCRIBE #1 NOTE: I, Leilani Rodgers, am scribing for, and in the presence of, Gorge Navarrete MD. I have scribed the HPI, ROS, and PEx.     SCRIBE #2 NOTE: I, Annalisa Geronimo, am scribing for, and in the presence of,  Amaya Dodson MD. I have scribed the remaining portions of the note not scribed by Scribe #1.     History      Chief Complaint   Patient presents with    Abdominal Cramping     increased vaginal bleeding, 2 weeks s/p ceserean section       Review of patient's allergies indicates:  No Known Allergies     HPI   HPI    8/3/2018, 1:29 AM   History obtained from the patient      History of Present Illness: Kumar Mccoy is a 18 y.o. female patient with a PMHx of anemia who presents to the Emergency Department for increased dark red vaginal bleeding with clots which onset gradually 2 days ago. Pt reports having a  performed on 2018 by Dr. Yoon and receiving a blood transfusion. Symptoms are episodic and moderate in severity. No mitigating or exacerbating factors reported. Associated sxs include lower abd pain described "knots". Patient denies any fever, chills, abd pain, vaginal discharge, dysuria, hematuria, n/v/d, lightheadedness, dizziness, extremity weakness/numbness, syncope, and all other sxs at this time. Pt's OB/GYN is Dr. Geronimo, and her next appointment is later this month. No further complaints or concerns at this time.         Arrival mode: Ambulance service    PCP: Primary Doctor No       Past Medical History:  Past Medical History:   Diagnosis Date    Anemia     Hypertension        Past Surgical History:  Past Surgical History:   Procedure Laterality Date     SECTION N/A 2018    Procedure:  SECTION;  Surgeon: Amalia Yoon MD;  Location: [facility] L&D;  Service: OB/GYN;  Laterality: N/A;    I&D LEFT ARM      TONSILLECTOMY, ADENOIDECTOMY, BILATERAL MYRINGOTOMY AND TUBES           Family History:  Family History   Problem Relation Age of Onset    Cancer " Maternal Grandmother     Diabetes Maternal Grandfather        Social History:  Social History     Social History Main Topics    Smoking status: Never Smoker    Smokeless tobacco: Never Used    Alcohol use No    Drug use: No    Sexual activity: Yes     Partners: Male       ROS   Review of Systems   Constitutional: Negative for chills and fever.   HENT: Negative for sore throat.    Respiratory: Negative for shortness of breath.    Cardiovascular: Negative for chest pain.   Gastrointestinal: Positive for abdominal pain. Negative for diarrhea, nausea and vomiting.   Genitourinary: Positive for vaginal bleeding. Negative for dysuria and hematuria.   Musculoskeletal: Negative for back pain.   Skin: Negative for rash.   Neurological: Negative for dizziness, syncope, weakness, light-headedness and numbness.   Hematological: Does not bruise/bleed easily.   All other systems reviewed and are negative.      Physical Exam      Initial Vitals [08/03/18 0126]   BP Pulse Resp Temp SpO2   (!) 170/95 86 18 98.7 °F (37.1 °C) 100 %      MAP       --          Physical Exam  Nursing Notes and Vital Signs Reviewed.  Constitutional: Patient is in no acute distress. Well-developed and well-nourished.  Head: Atraumatic. Normocephalic.  Eyes: PERRL. EOM intact. Conjunctivae are not pale. No scleral icterus.  ENT: Mucous membranes are moist. Oropharynx is clear and symmetric.    Neck: Supple. Full ROM. No lymphadenopathy.  Cardiovascular: Regular rate. Regular rhythm. Systolic murmur at the L sternal boarder. No rubs or gallops. Distal pulses are 2+ and symmetric.  Pulmonary/Chest: No respiratory distress. Clear to auscultation bilaterally. No wheezing or rales.  Abdominal: Soft and non-distended.  There is mild TTP of suprapubic area.  Uterus consistent with postpartum. No rebound, guarding, or rigidity.   Musculoskeletal: Moves all extremities. No obvious deformities. No edema.   Pelvic: A female chaperone was present for this  "examination. Nl external inspection. No lesions or abnormalities were visible on the labia majora or minora. Cervical os is closed. There is no CMT. There is dark red blood gently coming from the cervic. No discharge. No adnexal tenderness. No adnexal masses.   Skin: Warm and dry.  Neurological:  Alert, awake, and appropriate.  Normal speech.  No acute focal neurological deficits are appreciated.  Psychiatric: Normal affect. Good eye contact. Appropriate in content.    ED Course    Procedures  ED Vital Signs:  Vitals:    08/03/18 0126 08/03/18 0132 08/03/18 0247 08/03/18 0442   BP: (!) 170/95  (!) 166/98 (!) 169/99   Pulse: 86  74 70   Resp: 18  18 18   Temp: 98.7 °F (37.1 °C)  98.6 °F (37 °C) 98.6 °F (37 °C)   TempSrc: Oral  Oral Oral   SpO2: 100%  100% 100%   Weight:  88 kg (194 lb)     Height:  5' 9" (1.753 m)      08/03/18 0558   BP: (!) 162/98   Pulse: 60   Resp: 18   Temp: 98.7 °F (37.1 °C)   TempSrc: Oral   SpO2: 99%   Weight:    Height:        Abnormal Lab Results:  Labs Reviewed   CBC W/ AUTO DIFFERENTIAL - Abnormal; Notable for the following:        Result Value    RBC 3.71 (*)     Hemoglobin 9.6 (*)     Hematocrit 29.4 (*)     MCV 79 (*)     MCH 25.9 (*)     RDW 17.1 (*)     Platelets 354 (*)     MPV 8.9 (*)     All other components within normal limits   BASIC METABOLIC PANEL - Abnormal; Notable for the following:     Potassium 3.3 (*)     Calcium 8.4 (*)     All other components within normal limits   COMPREHENSIVE METABOLIC PANEL   MAGNESIUM   TROPONIN I        All Lab Results:  Results for orders placed or performed during the hospital encounter of 08/03/18   CBC W/ AUTO DIFFERENTIAL   Result Value Ref Range    WBC 8.79 3.90 - 12.70 K/uL    RBC 3.71 (L) 4.00 - 5.40 M/uL    Hemoglobin 9.6 (L) 12.0 - 16.0 g/dL    Hematocrit 29.4 (L) 37.0 - 48.5 %    MCV 79 (L) 82 - 98 fL    MCH 25.9 (L) 27.0 - 31.0 pg    MCHC 32.7 32.0 - 36.0 g/dL    RDW 17.1 (H) 11.5 - 14.5 %    Platelets 354 (H) 150 - 350 K/uL    MPV " 8.9 (L) 9.2 - 12.9 fL    Gran # (ANC) 5.9 1.8 - 7.7 K/uL    Lymph # 2.2 1.0 - 4.8 K/uL    Mono # 0.5 0.3 - 1.0 K/uL    Eos # 0.2 0.0 - 0.5 K/uL    Baso # 0.03 0.00 - 0.20 K/uL    Gran% 66.6 38.0 - 73.0 %    Lymph% 24.8 18.0 - 48.0 %    Mono% 5.7 4.0 - 15.0 %    Eosinophil% 2.6 0.0 - 8.0 %    Basophil% 0.3 0.0 - 1.9 %    Differential Method Automated    Basic metabolic panel   Result Value Ref Range    Sodium 139 136 - 145 mmol/L    Potassium 3.3 (L) 3.5 - 5.1 mmol/L    Chloride 106 95 - 110 mmol/L    CO2 24 23 - 29 mmol/L    Glucose 99 70 - 110 mg/dL    BUN, Bld 14 6 - 20 mg/dL    Creatinine 0.7 0.5 - 1.4 mg/dL    Calcium 8.4 (L) 8.7 - 10.5 mg/dL    Anion Gap 9 8 - 16 mmol/L    eGFR if African American >60 >60 mL/min/1.73 m^2    eGFR if non African American >60 >60 mL/min/1.73 m^2         Imaging Results:  Imaging Results          CT Abdomen Pelvis With Contrast (Final result)  Result time 18 07:47:16    Final result by Angelo Thomas MD (18 07:47:16)                 Impression:      Large hyperdense collection measuring 9.9 x 6.7 (by 10.6 craniocaudal) cm within the rectus sheath which is thought to reflect a hematoma. No definite active bleed is identified however study is not optimized for arterial evaluation.  Air bubbles within the collection could reflect instrumentation or superinfection.    All CT scans at this facility use dose modulation, iterative reconstruction, and/or weight based dosing when appropriate to reduce radiation dose to as low as reasonable achievable.      Electronically signed by: Angelo Thomas MD  Date:    2018  Time:    07:47             Narrative:    EXAMINATION:  CT ABDOMEN PELVIS WITH CONTRAST    CLINICAL HISTORY:  2wk post-op  bleeding and pain, eval of equivocal fluid collection on ultrasound;    TECHNIQUE:  Low dose axial images, sagittal and coronal reformations were obtained from the lung bases to the pubic symphysis following the IV administration of  75 mL of Omnipaque 350.    COMPARISON:  None    FINDINGS:  Heart: Normal size. No effusion.    Lung Bases: Clear.    Liver: Normal size and attenuation. No focal lesions.    Gallbladder: No calcified gallstones.    Bile Ducts: No dilatation.    Pancreas: No mass. No peripancreatic fat stranding.    Spleen: Normal.    Adrenals: Normal.    Kidneys/Ureters: Normal enhancement.  No mass or  hydroureteronephrosis.    Bladder: Collapsed with Hanson catheter.  Air within the bladder may reflect instrumentation.    Reproductive organs: Enlarged postpartum uterus with fluid within the endometrial canal.  Trace free fluid is seen in the pelvis.    GI Tract/Mesentery: No evidence of bowel obstruction or inflammation.  Moderate constipation.    Peritoneal Space: No ascites or free air.    Retroperitoneum: No significant adenopathy.    Abdominal wall: Large hyperdense collection measuring 9.9 x 6.7 (by 10.6 craniocaudal) cm within the rectus sheath which is thought to reflect a hematoma.  No definite active bleed is identified however study is not optimized for arterial evaluation.  Air bubbles within the collection could reflect instrumentation or superinfection.  Two tiny fluid collections are seen along the left lateral margin of the incision.    Vasculature: No aneurysm.    Bones: No acute fracture. No suspicious lytic or sclerotic lesions.                               US Pelvis Complete Non OB (In process)  Result time 08/03/18 07:50:27               Per [facility], pt's US results show 12 cm x 6.4 cm x 10 cm complex heterogeneously hypoechoic fluid collection anterior/superior to the uterine fundus which demonstrates dirty shadowing suggestive of intralesional air. Findings are nonspecific and may reflect hematoma or abscess. Contrast-enhanced CT recommended for further evaluation. Postpartum uterus with 7 mm endometrium and no clear transabdominal sonographic evidence for retained products of conception. Ovaries  not visualized. Mild fullness of the central left renal collecting system.       CT abdomen and pelvis w/ IV contrast:  Impression:  1. 12 cm x 6.4 cm x 10 cm heterogeneously dense collection containing a small amount of gas within the substance of the rectus sheath compatible w/ hematoma. Gas present within the hematoma may be secondary to recent surgery or superinfection. Surgical consolation and follow up recommended.  2. Tiny specific elongated low-density collection within the L lateral margin of the Pfannenstiel incision.  3. Postpartum uterus may be arcuate or septate configuration.     The Emergency Provider reviewed the vital signs and test results, which are outlined above.    ED Discussion     4:34 AM: Re-evaluated pt. Pt is resting comfortably and is in no acute distress.  D/w pt all pertinent results. D/w pt any concerns expressed at this time. Answered all questions. Pt expresses understanding at this time.    5:54 AM: Dr. Navarrete transfers care of pt to Dr. Dodson, pending imaging results.     7:11 AM: Dr. Dodson discussed the pt's case with Dr. Yoon (OB/GYN) who agrees to come to ED to see pt..    7:31 AM: Dr. Yoon at bedside with pt. Dr. Yoon states pt is okay to go home and f/u w/ OB/GYN in a few days.    8:31 AM:  Discussed with pt all pertinent ED information and results. Discussed to pt dx and plan of tx. Gave pt all f/u and return to the ED instructions. All questions and concerns were addressed at this time. Pt expresses understanding of information and instructions, and is comfortable with plan to discharge. Pt is stable for discharge.      ED Medication(s):  Medications   piperacillin-tazobactam 4.5 g in dextrose 5 % 100 mL IVPB (ready to mix system) (4.5 g Intravenous New Bag 8/3/18 9825)   ketorolac injection 30 mg (not administered)   omnipaque 350 iohexol 75 mL (75 mLs Intravenous Given 8/3/18 7565)       Current Discharge Medication List      START taking these medications    Details    amoxicillin-clavulanate 875-125mg (AUGMENTIN) 875-125 mg per tablet Take 1 tablet by mouth 2 (two) times daily. for 10 days  Qty: 20 tablet, Refills: 0             Follow-up Information     Amalia Yoon MD In 1 week.    Specialties:  Obstetrics, Obstetrics and Gynecology  Why:  For wound re-check  Contact information:  [address] [place] [address] DR Robbi VELAZQUEZ [id number] [phone]                     Medical Decision Making    Medical Decision Making:   Clinical Tests:   Lab Tests: Ordered and Reviewed  Radiological Study: Ordered and Reviewed  Medical Tests: Ordered and Reviewed           Scribe Attestation:   Scribe #1: I performed the above scribed service and the documentation accurately describes the services I performed. I attest to the accuracy of the note.    Attending:   Physician Attestation Statement for Scribe #1: I, Gorge Navarrete MD, personally performed the services described in this documentation, as scribed by Leilani Rodgers, in my presence, and it is both accurate and complete.       Scribe Attestation:   Scribe #2: I performed the above scribed service and the documentation accurately describes the services I performed. I attest to the accuracy of the note.    Attending Attestation:           Physician Attestation for Scribe:    Physician Attestation Statement for Scribe #2: I, Amaya Dodson MD, reviewed documentation, as scribed by Annalisa Geronimo in my presence, and it is both accurate and complete. I also acknowledge and confirm the content of the note done by Scribe #1.          Clinical Impression     No diagnosis found.    Disposition:   Disposition: Discharged  Condition: Stable         Amaya Dodson MD  08/16/18 0935

## 2018-08-16 ENCOUNTER — OFFICE VISIT (OUTPATIENT)
Dept: OBSTETRICS AND GYNECOLOGY | Facility: CLINIC | Age: 18
End: 2018-08-16
Payer: MEDICAID

## 2018-08-16 VITALS
WEIGHT: 189.13 LBS | HEIGHT: 69 IN | DIASTOLIC BLOOD PRESSURE: 80 MMHG | SYSTOLIC BLOOD PRESSURE: 124 MMHG | BODY MASS INDEX: 28.01 KG/M2

## 2018-08-16 DIAGNOSIS — S30.1XXD ABDOMINAL WALL HEMATOMA, SUBSEQUENT ENCOUNTER: ICD-10-CM

## 2018-08-16 DIAGNOSIS — R10.2 PELVIC PAIN: ICD-10-CM

## 2018-08-16 PROCEDURE — 96372 THER/PROPH/DIAG INJ SC/IM: CPT | Mod: PBBFAC

## 2018-08-16 PROCEDURE — 99999 PR PBB SHADOW E&M-EST. PATIENT-LVL III: CPT | Mod: PBBFAC,,, | Performed by: OBSTETRICS & GYNECOLOGY

## 2018-08-16 PROCEDURE — 99213 OFFICE O/P EST LOW 20 MIN: CPT | Mod: PBBFAC,25 | Performed by: OBSTETRICS & GYNECOLOGY

## 2018-08-16 RX ORDER — IBUPROFEN 800 MG/1
800 TABLET ORAL EVERY 8 HOURS
Qty: 30 TABLET | Refills: 1 | Status: SHIPPED | OUTPATIENT
Start: 2018-08-16 | End: 2019-01-03

## 2018-08-16 RX ORDER — OXYCODONE AND ACETAMINOPHEN 5; 325 MG/1; MG/1
1 TABLET ORAL EVERY 6 HOURS PRN
Qty: 20 TABLET | Refills: 0 | Status: SHIPPED | OUTPATIENT
Start: 2018-08-16 | End: 2019-01-31

## 2018-08-16 RX ORDER — KETOROLAC TROMETHAMINE 30 MG/ML
60 INJECTION, SOLUTION INTRAMUSCULAR; INTRAVENOUS
Status: COMPLETED | OUTPATIENT
Start: 2018-08-16 | End: 2018-08-16

## 2018-08-16 RX ADMIN — KETOROLAC TROMETHAMINE 60 MG: 60 INJECTION, SOLUTION INTRAMUSCULAR at 04:08

## 2018-08-16 NOTE — PROGRESS NOTES
Ketorolac 60mg given IM without difficulty.  Patient tolerated well.  Patient instructed to remain in clinic for 15 minutes following injection.  Patient verbalized understanding of instructions.

## 2018-08-23 ENCOUNTER — TELEPHONE (OUTPATIENT)
Dept: RADIOLOGY | Facility: HOSPITAL | Age: 18
End: 2018-08-23

## 2018-08-23 NOTE — PROGRESS NOTES
"CC: Post-partum follow-up    Kumar Mccoy is a 18 y.o. female  who presents for post-partum visit.  She is S/P a  for twins.  Post op course complicated by post op abdominal wall hematoma.  She is also c/o left wrist pain, as well as left leg pain and weakness since her delivery.  These sypmtoms are gradually improving.  The babies are doing well.  No pain.  No fever.   No bowel / bladder complaints.    Delivery Date: 18  Delivery MD: Car  Gender: Male/Male  Birth Weight: 2560 grams and 2510 grams   Breast Feeding: No  Depression: No  Contraception: Nexplanon    Pregnancy was complicated by:  Twins    /80   Ht 5' 9" (1.753 m)   Wt 85.8 kg (189 lb 2.5 oz)   BMI 27.93 kg/m²     ROS:  GENERAL: No fever, chills, fatigability.  VULVAR: No pain, no lesions and no itching.  VAGINAL: No relaxation, no itching, no discharge, no abnormal bleeding and no lesions.  ABDOMEN: No abdominal pain. Denies nausea. Denies vomiting. No diarrhea.   BREAST: Denies pain. No lumps. No discharge.  URINARY: No incontinence, no nocturia, no frequency and no dysuria.  CARDIOVASCULAR: No chest pain. No shortness of breath. No leg cramps.  NEUROLOGICAL: No headaches. No vision changes.    PHYSICAL EXAM:  ABDOMEN:  Soft, non-tender, non-distended, hematoma very reduced in size compared to last exam.  VULVA:  Normal, no lesions  CERVIX:  Without lesions, polyps or tenderness.  UTERUS:  Normal size, shape, consistency, no mass,  Tenderness is present.  ADNEXA:  Normal in size without mass or tenderness  EXT:  No neurologic deficits noted      IMP:  S/P   Instructions / precautions reviewed  Contraceptive counseling      PLAN:  Toradol 60 mg IM for pain  Continue ibuprofen 800 mg q 8 hours  May resume normal activities  Return: 3-4 weeks      "

## 2018-08-29 ENCOUNTER — TELEPHONE (OUTPATIENT)
Dept: RADIOLOGY | Facility: HOSPITAL | Age: 18
End: 2018-08-29

## 2018-08-30 ENCOUNTER — HOSPITAL ENCOUNTER (OUTPATIENT)
Dept: RADIOLOGY | Facility: HOSPITAL | Age: 18
Discharge: HOME | End: 2018-08-30
Attending: OBSTETRICS & GYNECOLOGY
Payer: MEDICAID

## 2018-08-30 DIAGNOSIS — R10.2 PELVIC PAIN: ICD-10-CM

## 2018-08-30 DIAGNOSIS — S30.1XXD ABDOMINAL WALL HEMATOMA, SUBSEQUENT ENCOUNTER: ICD-10-CM

## 2018-08-30 PROCEDURE — 76856 US EXAM PELVIC COMPLETE: CPT | Mod: TC

## 2018-08-30 PROCEDURE — 76856 US EXAM PELVIC COMPLETE: CPT | Mod: 26,,, | Performed by: RADIOLOGY

## 2019-01-02 ENCOUNTER — HOSPITAL ENCOUNTER (EMERGENCY)
Facility: HOSPITAL | Age: 19
Discharge: HOME | End: 2019-01-03
Attending: EMERGENCY MEDICINE
Payer: MEDICAID

## 2019-01-02 VITALS
TEMPERATURE: 98 F | HEIGHT: 71 IN | HEART RATE: 61 BPM | SYSTOLIC BLOOD PRESSURE: 129 MMHG | WEIGHT: 209.19 LBS | BODY MASS INDEX: 29.29 KG/M2 | DIASTOLIC BLOOD PRESSURE: 72 MMHG | RESPIRATION RATE: 20 BRPM | OXYGEN SATURATION: 96 %

## 2019-01-02 DIAGNOSIS — J20.9 ACUTE BRONCHITIS, UNSPECIFIED ORGANISM: Primary | ICD-10-CM

## 2019-01-02 DIAGNOSIS — R20.8 SKIN PAIN: ICD-10-CM

## 2019-01-02 DIAGNOSIS — R05.9 COUGH: ICD-10-CM

## 2019-01-02 PROCEDURE — 99284 EMERGENCY DEPT VISIT MOD MDM: CPT

## 2019-01-03 RX ORDER — PROMETHAZINE HYDROCHLORIDE AND DEXTROMETHORPHAN HYDROBROMIDE 6.25; 15 MG/5ML; MG/5ML
5 SYRUP ORAL EVERY 6 HOURS PRN
Qty: 150 ML | Refills: 0 | Status: SHIPPED | OUTPATIENT
Start: 2019-01-03 | End: 2019-01-13

## 2019-01-03 RX ORDER — METHYLPREDNISOLONE 4 MG/1
TABLET ORAL
Qty: 1 PACKAGE | Refills: 0 | Status: SHIPPED | OUTPATIENT
Start: 2019-01-03 | End: 2019-01-24

## 2019-01-03 RX ORDER — ALBUTEROL SULFATE 90 UG/1
1-2 AEROSOL, METERED RESPIRATORY (INHALATION) EVERY 6 HOURS PRN
Qty: 1 INHALER | Refills: 0 | Status: SHIPPED | OUTPATIENT
Start: 2019-01-03 | End: 2020-01-03

## 2019-01-03 RX ORDER — IBUPROFEN 600 MG/1
600 TABLET ORAL EVERY 6 HOURS PRN
Qty: 20 TABLET | Refills: 0 | Status: SHIPPED | OUTPATIENT
Start: 2019-01-03 | End: 2019-01-31

## 2019-01-03 NOTE — ED PROVIDER NOTES
HISTORY     Chief Complaint   Patient presents with    Cough     patient reports cough for 1 month    Abdominal Pain     patient reports csection 5 months ago and is having pain at the incision site     Review of patient's allergies indicates:  No Known Allergies     HPI   The history is provided by the patient.   Cough   This is a new problem. The current episode started several weeks ago. The problem occurs constantly. The problem has been unchanged. The cough is non-productive. There has been no fever. Pertinent negatives include no chest pain, no sore throat and no shortness of breath. She is a smoker.   Abdominal Pain   The other symptoms of the illness do not include fever, shortness of breath, nausea or dysuria.   Symptoms associated with the illness do not include back pain.    Pain at  site for past 5 months    PCP: Primary Doctor No     Past Medical History:  Past Medical History:   Diagnosis Date    Anemia     Hypertension         Past Surgical History:  Past Surgical History:   Procedure Laterality Date     SECTION       SECTION N/A 2018    [facility]    I&D LEFT ARM      TONSILLECTOMY, ADENOIDECTOMY, BILATERAL MYRINGOTOMY AND TUBES          Family History:  Family History   Problem Relation Age of Onset    Cancer Maternal Grandmother     Diabetes Maternal Grandfather         Social History:  Social History     Tobacco Use    Smoking status: Never Smoker    Smokeless tobacco: Never Used   Substance and Sexual Activity    Alcohol use: No    Drug use: No    Sexual activity: Yes     Partners: Male         ROS   Review of Systems   Constitutional: Negative for fever.   HENT: Negative for sore throat.    Respiratory: Positive for cough. Negative for shortness of breath.    Cardiovascular: Negative for chest pain.   Gastrointestinal: Positive for abdominal pain. Negative for nausea.   Genitourinary: Negative for dysuria.  "  Musculoskeletal: Negative for back pain.   Skin: Negative for rash.   Neurological: Negative for weakness.   Hematological: Does not bruise/bleed easily.   All other systems reviewed and are negative.      PHYSICAL EXAM     Initial Vitals [01/02/19 2337]   BP Pulse Resp Temp SpO2   129/72 61 20 98.2 °F (36.8 °C) 96 %      MAP       --           Physical Exam    Constitutional: She appears well-developed and well-nourished. She is not diaphoretic. No distress.   HENT:   Head: Normocephalic and atraumatic.   Eyes: Conjunctivae and EOM are normal. Pupils are equal, round, and reactive to light.   Neck: Normal range of motion. Neck supple.   Cardiovascular: Normal rate, regular rhythm and normal heart sounds.   No murmur heard.  Pulmonary/Chest: Breath sounds normal. No respiratory distress. She has no wheezes. She has no rales.   Abdominal: Soft. Bowel sounds are normal. There is no tenderness. There is no rebound and no guarding.   Musculoskeletal: Normal range of motion. She exhibits no edema or tenderness.   Neurological: She is alert and oriented to person, place, and time. No cranial nerve deficit. GCS score is 15. GCS eye subscore is 4. GCS verbal subscore is 5. GCS motor subscore is 6.   Skin: Skin is warm and dry. Capillary refill takes less than 2 seconds.   Psychiatric: She has a normal mood and affect. Thought content normal.          ED COURSE   Procedures  ED ONGOING VITALS:  Vitals:    01/02/19 2337   BP: 129/72   Pulse: 61   Resp: 20   Temp: 98.2 °F (36.8 °C)   TempSrc: Oral   SpO2: 96%   Weight: 94.9 kg (209 lb 3.5 oz)   Height: 5' 11" (1.803 m)         ABNORMAL LAB VALUES:  Labs Reviewed - No data to display      ALL LAB VALUES:        RADIOLOGY STUDIES:  Imaging Results    None                   The above vital signs and test results have been reviewed by the emergency provider.     ED Medications:  Current Discharge Medication List      START taking these medications    Details   albuterol " (PROVENTIL/VENTOLIN HFA) 90 mcg/actuation inhaler Inhale 1-2 puffs into the lungs every 6 (six) hours as needed for Wheezing. Rescue  Qty: 1 Inhaler, Refills: 0      ibuprofen (ADVIL,MOTRIN) 600 MG tablet Take 1 tablet (600 mg total) by mouth every 6 (six) hours as needed for Pain.  Qty: 20 tablet, Refills: 0      methylPREDNISolone (MEDROL DOSEPACK) 4 mg tablet Take as directed  Qty: 1 Package, Refills: 0      promethazine-dextromethorphan (PROMETHAZINE-DM) 6.25-15 mg/5 mL Syrp Take 5 mLs by mouth every 6 (six) hours as needed.  Qty: 150 mL, Refills: 0           Discharge Medications:  This SmartLink is deprecated. Use [employer] Automation[employer]LIST instead to display the medication list for a patient.   Follow-up Information     [facility]    Specialty:  Emergency Medicine  Why:  If symptoms worsen  Contact information:  [id number] [facility] [address] [phone]               12:05 AM: Discussed pt dx and plan of tx. Gave pt all f/u and return to the ED instructions. All questions and concerns were addressed at this time. Pt expresses understanding of information and instructions, and is comfortable with plan to discharge. Pt is stable for discharge.     I discussed with patient and/or family/caretaker that evaluation in the ED does not suggest any emergent or life threatening medical conditions requiring immediate intervention beyond what was provided in the ED, and I believe patient is safe for discharge. Regardless, an unremarkable evaluation in the ED does not preclude the development or presence of a serious or life threatening condition. As such, patient was instructed to return immediately for any worsening or change in current symptoms.        MEDICAL DECISION MAKING                 CLINICAL IMPRESSION       ICD-10-CM ICD-9-CM   1. Acute bronchitis, unspecified organism J20.9 466.0   2. Skin pain R20.8 782.0   3. Cough R05 786.2               Nick Vela Jr.,  KURT  01/03/19 0155

## 2019-01-09 ENCOUNTER — HOSPITAL ENCOUNTER (EMERGENCY)
Facility: HOSPITAL | Age: 19
Discharge: HOME | End: 2019-01-09
Attending: EMERGENCY MEDICINE
Payer: MEDICAID

## 2019-01-09 VITALS
HEIGHT: 69 IN | SYSTOLIC BLOOD PRESSURE: 138 MMHG | HEART RATE: 76 BPM | DIASTOLIC BLOOD PRESSURE: 71 MMHG | TEMPERATURE: 99 F | WEIGHT: 209 LBS | BODY MASS INDEX: 30.96 KG/M2 | RESPIRATION RATE: 13 BRPM | OXYGEN SATURATION: 99 %

## 2019-01-09 DIAGNOSIS — J20.8 ACUTE BACTERIAL BRONCHITIS: Primary | ICD-10-CM

## 2019-01-09 DIAGNOSIS — J30.9 ALLERGIC RHINITIS, UNSPECIFIED SEASONALITY, UNSPECIFIED TRIGGER: ICD-10-CM

## 2019-01-09 DIAGNOSIS — R05.9 COUGH: ICD-10-CM

## 2019-01-09 DIAGNOSIS — B96.89 ACUTE BACTERIAL BRONCHITIS: Primary | ICD-10-CM

## 2019-01-09 PROCEDURE — 99284 EMERGENCY DEPT VISIT MOD MDM: CPT | Mod: 25

## 2019-01-09 PROCEDURE — 96372 THER/PROPH/DIAG INJ SC/IM: CPT

## 2019-01-09 PROCEDURE — 63600175 PHARM REV CODE 636 W HCPCS: Performed by: NURSE PRACTITIONER

## 2019-01-09 RX ORDER — LEVOCETIRIZINE DIHYDROCHLORIDE 5 MG/1
5 TABLET, FILM COATED ORAL NIGHTLY
Qty: 30 TABLET | Refills: 11 | Status: SHIPPED | OUTPATIENT
Start: 2019-01-09 | End: 2019-01-09 | Stop reason: SDUPTHER

## 2019-01-09 RX ORDER — DEXAMETHASONE SODIUM PHOSPHATE 4 MG/ML
10 INJECTION, SOLUTION INTRA-ARTICULAR; INTRALESIONAL; INTRAMUSCULAR; INTRAVENOUS; SOFT TISSUE
Status: COMPLETED | OUTPATIENT
Start: 2019-01-09 | End: 2019-01-09

## 2019-01-09 RX ORDER — FLUTICASONE PROPIONATE 50 MCG
1 SPRAY, SUSPENSION (ML) NASAL 2 TIMES DAILY PRN
Qty: 15 G | Refills: 0 | Status: SHIPPED | OUTPATIENT
Start: 2019-01-09

## 2019-01-09 RX ORDER — LEVOCETIRIZINE DIHYDROCHLORIDE 5 MG/1
5 TABLET, FILM COATED ORAL NIGHTLY
Qty: 30 TABLET | Refills: 0 | Status: SHIPPED | OUTPATIENT
Start: 2019-01-09 | End: 2019-01-31

## 2019-01-09 RX ORDER — CEFTRIAXONE 1 G/1
1 INJECTION, POWDER, FOR SOLUTION INTRAMUSCULAR; INTRAVENOUS
Status: COMPLETED | OUTPATIENT
Start: 2019-01-09 | End: 2019-01-09

## 2019-01-09 RX ADMIN — CEFTRIAXONE SODIUM 1 G: 1 INJECTION, POWDER, FOR SOLUTION INTRAMUSCULAR; INTRAVENOUS at 06:01

## 2019-01-09 RX ADMIN — DEXAMETHASONE SODIUM PHOSPHATE 10 MG: 4 INJECTION, SOLUTION INTRA-ARTICULAR; INTRALESIONAL; INTRAMUSCULAR; INTRAVENOUS; SOFT TISSUE at 06:01

## 2019-01-09 NOTE — ED PROVIDER NOTES
SCRIBE #1 NOTE: I, Aurora Henderson, am scribing for, and in the presence of, Everardo Dumont NP. I have scribed the entire note.       History     Chief Complaint   Patient presents with    Cough     Pt c/o productive cough that wont get better, seen here recently but cough medicine not helping.      Review of patient's allergies indicates:  No Known Allergies      History of Present Illness     HPI    2019, 5:37 PM  History obtained from the patient      History of Present Illness: Kumar Mccoy is a 18 y.o. female patient with a PMHx including anemia and HTN who presents to the Emergency Department for evaluation of cough which onset gradually 1.5 months ago. Symptoms are constant and moderate in severity. Pt was seen in the ED 1 week ago for same sx and was Rx Promethazine-DM, albuterol inhaler, ibuprofen, and medrol dose pack. Pt states she has not finished her medrol dose pack. Pt reports her cough is not getting any better. Exacerbated by laying down. No mitigating factors reported. No associated sxs. Patient denies any SOB, wheezing, fever, chills, congestion, CP, and all other sxs at this time. No further complaints or concerns at this time.         Arrival mode: Personal vehicle      PCP: Primary Doctor No        Past Medical History:  Past Medical History:   Diagnosis Date    Anemia     Hypertension        Past Surgical History:  Past Surgical History:   Procedure Laterality Date     SECTION       SECTION N/A 2018    Performed by Amalia Yoon MD at [facility] L&D    I&D LEFT ARM      TONSILLECTOMY, ADENOIDECTOMY, BILATERAL MYRINGOTOMY AND TUBES           Family History:  Family History   Problem Relation Age of Onset    Cancer Maternal Grandmother     Diabetes Maternal Grandfather        Social History:  Social History     Tobacco Use    Smoking status: Never Smoker    Smokeless tobacco: Never Used   Substance and Sexual Activity    Alcohol use: No    Drug use:  No    Sexual activity: Yes     Partners: Male        Review of Systems     Review of Systems   Constitutional: Negative for chills and fever.   HENT: Negative for sore throat.    Respiratory: Positive for cough. Negative for chest tightness, shortness of breath and wheezing.    Cardiovascular: Negative for chest pain.   Gastrointestinal: Negative for nausea.   Genitourinary: Negative for dysuria.   Musculoskeletal: Negative for back pain.   Skin: Negative for rash.   Neurological: Negative for weakness.   Hematological: Does not bruise/bleed easily.   All other systems reviewed and are negative.       Physical Exam     Initial Vitals [01/09/19 1731]   BP Pulse Resp Temp SpO2   (!) 147/71 78 18 98.9 °F (37.2 °C) 99 %      MAP       --          Physical Exam  Nursing Notes and Vital Signs Reviewed.  Constitutional: Patient is in no apparent distress. Well-developed and well-nourished.  Head: Atraumatic. Normocephalic.  Eyes: PERRL. EOM intact. Conjunctivae are not pale. No scleral icterus.  ENT: Mucous membranes are moist. Oropharynx is clear and symmetric.  Rhinorrhea.  Neck: Supple. Full ROM. No lymphadenopathy.  Cardiovascular: Regular rate. Regular rhythm. No murmurs, rubs, or gallops. Distal pulses are 2+ and symmetric.  Pulmonary/Chest: No respiratory distress. Clear to auscultation bilaterally. No wheezing or rales.  Abdominal: Soft and non-distended.  There is no tenderness.  No rebound, guarding, or rigidity. Good bowel sounds.  Genitourinary: No CVA tenderness  Musculoskeletal: Moves all extremities. No obvious deformities. No edema. No calf tenderness.  Skin: Warm and dry.  Neurological:  Alert, awake, and appropriate.  Normal speech.  No acute focal neurological deficits are appreciated.  Psychiatric: Normal affect. Good eye contact. Appropriate in content.     ED Course   Procedures  ED Vital Signs:  Vitals:    01/09/19 1731 01/09/19 1847   BP: (!) 147/71 138/71   Pulse: 78 76   Resp: 18 13   Temp: 98.9  "°F (37.2 °C) 98.9 °F (37.2 °C)   TempSrc: Oral Oral   SpO2: 99% 99%   Weight: 94.8 kg (209 lb)    Height: 5' 9" (1.753 m)        Abnormal Lab Results:  Labs Reviewed - No data to display     All Lab Results:  none    Imaging Results:  Imaging Results          X-Ray Chest PA And Lateral (Final result)  Result time 01/09/19 17:57:12    Final result by Angel Frye MD (01/09/19 17:57:12)                 Impression:      No acute abnormality.      Electronically signed by: Ru Frye MD  Date:    01/09/2019  Time:    17:57             Narrative:    EXAMINATION:  XR CHEST PA AND LATERAL    CLINICAL HISTORY:  Cough    TECHNIQUE:  PA and lateral views of the chest were performed.    COMPARISON:  None    FINDINGS:  The lungs are clear, with normal appearance of pulmonary vasculature and no pleural effusion or pneumothorax.    The cardiac silhouette is normal in size. The hilar and mediastinal contours are unremarkable.    Bones are intact.                                            The Emergency Provider reviewed the vital signs and test results, which are outlined above.     ED Discussion     6:16 PM: Discussed with pt all pertinent ED information and results. Discussed pt dx of and plan of tx. Gave pt all f/u and return to the ED instructions. All questions and concerns were addressed at this time. Pt expresses understanding of information and instructions, and is comfortable with plan to discharge. Pt is stable for discharge.      I discussed with patient and/or family/caretaker that evaluation in the ED does not suggest any emergent or life threatening medical conditions requiring immediate intervention beyond what was provided in the ED, and I believe patient is safe for discharge.  Regardless, an unremarkable evaluation in the ED does not preclude the development or presence of a serious of life threatening condition. As such, patient was instructed to return immediately for any worsening or change in " current symptoms.      ED Medication(s):  Medications   dexamethasone injection 10 mg (10 mg Intramuscular Given 1/9/19 1831)   cefTRIAXone injection 1 g (1 g Intramuscular Given 1/9/19 1831)     Current Discharge Medication List      START taking these medications    Details   fluticasone (FLONASE) 50 mcg/actuation nasal spray 1 spray (50 mcg total) by Each Nare route 2 (two) times daily as needed.  Qty: 15 g, Refills: 0      levocetirizine (XYZAL) 5 MG tablet Take 1 tablet (5 mg total) by mouth every evening.  Qty: 30 tablet, Refills: 0               Follow-up Information     [facility] - .    Specialty:  Emergency Medicine  Why:  As needed, If symptoms worsen  Contact information:  [address] [address]  [facility] [address] [phone]           PCP.                        Medical Decision Making                 Scribe Attestation:   Scribe #1: I performed the above scribed service and the documentation accurately describes the services I performed. I attest to the accuracy of the note. 01/09/2019 6:20 PM    Attending:   Physician Attestation Statement for Scribe #1: I, Everardo Dumont NP, personally performed the services described in this documentation, as scribed by Aurora Henderson, in my presence, and it is both accurate and complete.           Clinical Impression       ICD-10-CM ICD-9-CM   1. Acute bacterial bronchitis J20.8 466.0    B96.89 041.9   2. Cough R05 786.2   3. Allergic rhinitis, unspecified seasonality, unspecified trigger J30.9 477.9       Disposition:   Disposition: Discharged  Condition: Stable               Everardo Dumont NP  01/10/19 0126

## 2019-01-19 ENCOUNTER — HOSPITAL ENCOUNTER (EMERGENCY)
Facility: HOSPITAL | Age: 19
Discharge: HOME | End: 2019-01-19
Attending: EMERGENCY MEDICINE
Payer: MEDICAID

## 2019-01-19 VITALS
BODY MASS INDEX: 29.55 KG/M2 | WEIGHT: 199.5 LBS | HEIGHT: 69 IN | DIASTOLIC BLOOD PRESSURE: 65 MMHG | HEART RATE: 108 BPM | RESPIRATION RATE: 18 BRPM | TEMPERATURE: 98 F | SYSTOLIC BLOOD PRESSURE: 123 MMHG | OXYGEN SATURATION: 97 %

## 2019-01-19 DIAGNOSIS — Z34.90 PREGNANCY AT EARLY STAGE: Primary | ICD-10-CM

## 2019-01-19 DIAGNOSIS — F12.10 MARIJUANA ABUSE: ICD-10-CM

## 2019-01-19 DIAGNOSIS — R10.9 ABDOMINAL CRAMPING: ICD-10-CM

## 2019-01-19 LAB
B-HCG UR QL: POSITIVE
BILIRUB UR QL STRIP: NEGATIVE
CLARITY UR: CLEAR
COLOR UR: YELLOW
GLUCOSE UR QL STRIP: NEGATIVE
HGB UR QL STRIP: NEGATIVE
KETONES UR QL STRIP: ABNORMAL
LEUKOCYTE ESTERASE UR QL STRIP: NEGATIVE
NITRITE UR QL STRIP: NEGATIVE
PH UR STRIP: 6 [PH] (ref 5–8)
PROT UR QL STRIP: ABNORMAL
SP GR UR STRIP: 1.02 (ref 1–1.03)
URN SPEC COLLECT METH UR: ABNORMAL
UROBILINOGEN UR STRIP-ACNC: NEGATIVE EU/DL

## 2019-01-19 PROCEDURE — 81025 URINE PREGNANCY TEST: CPT

## 2019-01-19 PROCEDURE — 99283 EMERGENCY DEPT VISIT LOW MDM: CPT

## 2019-01-19 PROCEDURE — 81003 URINALYSIS AUTO W/O SCOPE: CPT

## 2019-01-19 NOTE — ED PROVIDER NOTES
HISTORY     Chief Complaint   Patient presents with    Abdominal Cramping     patient states she is preg, unsure of how far along, c/o abdominal cramping, denies vaginal bleeding    Arm Pain     c/o right arm pain      Review of patient's allergies indicates:  No Known Allergies     HPI   The history is provided by the patient.   Abdominal Cramping   The primary symptoms of the illness include nausea. The primary symptoms of the illness do not include fever, shortness of breath, vomiting, dysuria, vaginal discharge or vaginal bleeding. The current episode started yesterday. The onset of the illness was gradual.   The patient states that she believes she is currently pregnant. The patient has not had a change in bowel habit. Symptoms associated with the illness do not include chills, anorexia, heartburn, constipation, urgency, frequency or back pain.        PCP: Primary Doctor No     Past Medical History:  Past Medical History:   Diagnosis Date    Anemia     Hypertension         Past Surgical History:  Past Surgical History:   Procedure Laterality Date     SECTION       SECTION N/A 2018    Performed by [facility] at [facility] L&D    I&D LEFT ARM      TONSILLECTOMY, ADENOIDECTOMY, BILATERAL MYRINGOTOMY AND TUBES          Family History:  Family History   Problem Relation Age of Onset    Cancer Maternal Grandmother     Diabetes Maternal Grandfather         Social History:  Social History     Tobacco Use    Smoking status: Never Smoker    Smokeless tobacco: Never Used   Substance and Sexual Activity    Alcohol use: No    Drug use: No    Sexual activity: Yes     Partners: Male         ROS   Review of Systems   Constitutional: Negative for chills and fever.   HENT: Negative for sore throat.    Respiratory: Negative for shortness of breath.    Cardiovascular: Negative for chest pain.   Gastrointestinal: Positive for nausea. Negative for anorexia, constipation, heartburn  "and vomiting.   Genitourinary: Negative for dysuria, frequency, urgency, vaginal bleeding and vaginal discharge.   Musculoskeletal: Negative for back pain.   Skin: Negative for rash.   Neurological: Negative for weakness.   Hematological: Does not bruise/bleed easily.   All other systems reviewed and are negative.      PHYSICAL EXAM     Initial Vitals [01/19/19 1144]   BP Pulse Resp Temp SpO2   123/65 108 18 98.2 °F (36.8 °C) 97 %      MAP       --           Physical Exam    Constitutional: She appears well-developed and well-nourished. She is not diaphoretic. No distress.   HENT:   Head: Normocephalic and atraumatic.   Eyes: Conjunctivae and EOM are normal. Pupils are equal, round, and reactive to light.   Neck: Normal range of motion. Neck supple.   Cardiovascular: Normal rate, regular rhythm and normal heart sounds.   No murmur heard.  Pulmonary/Chest: Breath sounds normal. No respiratory distress. She has no wheezes. She has no rales.   Abdominal: Soft. Bowel sounds are normal. There is no tenderness. There is no rebound and no guarding.   Musculoskeletal: Normal range of motion. She exhibits no edema or tenderness.   Neurological: She is alert and oriented to person, place, and time. No cranial nerve deficit. GCS score is 15. GCS eye subscore is 4. GCS verbal subscore is 5. GCS motor subscore is 6.   Skin: Skin is warm and dry. Capillary refill takes less than 2 seconds.   Psychiatric: She has a normal mood and affect. Thought content normal.          ED COURSE   Procedures  ED ONGOING VITALS:  Vitals:    01/19/19 1144   BP: 123/65   Pulse: 108   Resp: 18   Temp: 98.2 °F (36.8 °C)   SpO2: 97%   Weight: 90.5 kg (199 lb 8.3 oz)   Height: 5' 9" (1.753 m)         ABNORMAL LAB VALUES:  Labs Reviewed   URINALYSIS, REFLEX TO URINE CULTURE - Abnormal; Notable for the following components:       Result Value    Protein, UA Trace (*)     Ketones, UA 2+ (*)     All other components within normal limits    Narrative:     " Preferred Collection Type->Urine, Clean Catch   PREGNANCY TEST, URINE RAPID         ALL LAB VALUES:  Results for orders placed or performed during the hospital encounter of 01/19/19   Urinalysis, Reflex to Urine Culture Urine, Clean Catch   Result Value Ref Range    Specimen UA Urine, Clean Catch     Color, UA Yellow Yellow, Straw, Clotilde    Appearance, UA Clear Clear    pH, UA 6.0 5.0 - 8.0    Specific Gravity, UA 1.020 1.005 - 1.030    Protein, UA Trace (A) Negative    Glucose, UA Negative Negative    Ketones, UA 2+ (A) Negative    Bilirubin (UA) Negative Negative    Occult Blood UA Negative Negative    Nitrite, UA Negative Negative    Urobilinogen, UA Negative <2.0 EU/dL    Leukocytes, UA Negative Negative   Pregnancy, urine rapid (UPT)   Result Value Ref Range    Preg Test, Ur Positive            RADIOLOGY STUDIES:  Imaging Results    None                   The above vital signs and test results have been reviewed by the emergency provider.     ED Medications:  Current Discharge Medication List      Medication List      START taking these medications    prenatal 21-iron fu-folic acid 14 mg iron- 400 mcg Tab  Commonly known as:  PRENATAL COMPLETE  Take 1 tablet by mouth once daily.        ASK your doctor about these medications    albuterol 90 mcg/actuation inhaler  Commonly known as:  PROVENTIL/VENTOLIN HFA  Inhale 1-2 puffs into the lungs every 6 (six) hours as needed for Wheezing. Rescue     fluticasone 50 mcg/actuation nasal spray  Commonly known as:  FLONASE  1 spray (50 mcg total) by Each Nare route 2 (two) times daily as needed.     ibuprofen 600 MG tablet  Commonly known as:  ADVIL,MOTRIN  Take 1 tablet (600 mg total) by mouth every 6 (six) hours as needed for Pain.     iron polysacch complex-b12-fa 150-25-1 mg-mcg-mg 150-25-1 mg-mcg-mg Cap  Commonly known as:  FERREX FORTE  Take 1 capsule by mouth once daily.     levocetirizine 5 MG tablet  Commonly known as:  XYZAL  Take 1 tablet (5 mg total) by mouth  every evening.     methylPREDNISolone 4 mg tablet  Commonly known as:  MEDROL DOSEPACK  Take as directed     oxyCODONE-acetaminophen 5-325 mg per tablet  Commonly known as:  PERCOCET  Take 1 tablet by mouth every 6 (six) hours as needed for Pain.           Where to Get Your Medications      These medications were sent to [id number] - [place] - [address] AT [facility]  [address], [facility] [address]    Phone:  [phone] ·   prenatal 21-iron fu-folic acid 14 mg iron- 400 mcg Tab            Discharge Medications:  This SmartLink is deprecated. Use MingyianEDLIST instead to display the medication list for a patient.   Follow-up Information     [facility] - OB/ GYN In 1 week.    Specialty:  Obstetrics and Gynecology  Contact information:  [address] [facility] [address] [phone]  Additional information:  (off [facility]) 3rd floor           [facility] - .    Specialty:  Emergency Medicine  Why:  If symptoms worsen  Contact information:  [address] [facility] [address] [phone]                12:54 PM: Discussed with pt all pertinent ED information and results. Discussed pt dx and plan of tx. Gave pt all f/u and return to the ED instructions. All questions and concerns were addressed at this time. Pt expresses understanding of information and instructions, and is comfortable with plan to discharge. Pt is stable for discharge.    I discussed with patient and/or family/caretaker that evaluation in the ED does not suggest any emergent or life threatening medical conditions requiring immediate intervention beyond what was provided in the ED, and I believe patient is safe for discharge. Regardless, an unremarkable evaluation in the ED does not preclude the development or presence of a serious or life threatening condition. As such, patient was instructed to return immediately for any worsening or  change in current symptoms.        MEDICAL DECISION MAKING                 CLINICAL IMPRESSION       ICD-10-CM ICD-9-CM   1. Pregnancy at early stage Z34.90 V22.2   2. Abdominal cramping R10.9 789.00   3. Marijuana abuse F12.10 305.20               Nick Vela Jr., [facility]  01/20/19 1141       Nick Vela Jr., [facility]  01/20/19 1141

## 2019-01-29 ENCOUNTER — HOSPITAL ENCOUNTER (EMERGENCY)
Facility: HOSPITAL | Age: 19
Discharge: HOME | End: 2019-01-30
Attending: EMERGENCY MEDICINE
Payer: MEDICAID

## 2019-01-29 VITALS
TEMPERATURE: 99 F | HEART RATE: 67 BPM | HEIGHT: 69 IN | RESPIRATION RATE: 20 BRPM | SYSTOLIC BLOOD PRESSURE: 132 MMHG | OXYGEN SATURATION: 100 % | DIASTOLIC BLOOD PRESSURE: 77 MMHG | WEIGHT: 198.88 LBS | BODY MASS INDEX: 29.46 KG/M2

## 2019-01-29 DIAGNOSIS — R05.9 COUGH: Primary | ICD-10-CM

## 2019-01-29 DIAGNOSIS — G89.29 CHRONIC WRIST PAIN, LEFT: ICD-10-CM

## 2019-01-29 DIAGNOSIS — M25.532 CHRONIC WRIST PAIN, LEFT: ICD-10-CM

## 2019-01-29 PROCEDURE — 87502 INFLUENZA DNA AMP PROBE: CPT

## 2019-01-29 PROCEDURE — 99283 EMERGENCY DEPT VISIT LOW MDM: CPT

## 2019-01-30 LAB
INFLUENZA A, MOLECULAR: NEGATIVE
INFLUENZA B, MOLECULAR: NEGATIVE
SPECIMEN SOURCE: NORMAL

## 2019-01-30 RX ORDER — PROMETHAZINE HYDROCHLORIDE AND DEXTROMETHORPHAN HYDROBROMIDE 6.25; 15 MG/5ML; MG/5ML
5 SYRUP ORAL 3 TIMES DAILY PRN
Qty: 180 ML | Refills: 0 | Status: SHIPPED | OUTPATIENT
Start: 2019-01-30 | End: 2019-02-09

## 2019-01-30 NOTE — ED PROVIDER NOTES
SCRIBE #1 NOTE: I, Corinne Mack, am scribing for, and in the presence of, Everardo Dumont NP. I have scribed the entire note.      History      Chief Complaint   Patient presents with    Cough     pt reports she was treated about 1 week ago for cough but she is still coughing alot    Wrist Pain     pt reports chronic L wrist pain       Review of patient's allergies indicates:  No Known Allergies     HPI   HPI    2019, 11:20 PM   History obtained from the patient      History of Present Illness: Kumar Mccoy is a 18 y.o. female patient with PMHx of HTN who presents to the Emergency Department for cough which onset gradually 2 months ago. Pt has been seen and treated at this facility for the same complaint within the last month, but states she is still having cough despite taking all her Rx medications and quitting smoking. Symptoms are episodic and moderate in severity. Pt also c/o chronic L wrist pain. No mitigating or exacerbating factors reported. Associated sxs include nausea. Patient denies any fever, chills, congestion, rhinorrhea, sore throat, CP, SOB, N/V/D, abd pain, back pain, neck pain, HA, dizziness, and all other sxs at this time. No prior Tx reported. No further complaints or concerns at this time.         Arrival mode: Personal vehicle    PCP: Shauna Wood CNM       Past Medical History:  Past Medical History:   Diagnosis Date    Anemia     Hypertension        Past Surgical History:  Past Surgical History:   Procedure Laterality Date     SECTION       SECTION N/A 2018    Performed by [facility] at [facility] L&D    I&D LEFT ARM      TONSILLECTOMY, ADENOIDECTOMY, BILATERAL MYRINGOTOMY AND TUBES           Family History:  Family History   Problem Relation Age of Onset    Cancer Maternal Grandmother     Diabetes Maternal Grandfather        Social History:  Social History     Tobacco Use    Smoking status: Never Smoker    Smokeless tobacco: Never  Used   Substance and Sexual Activity    Alcohol use: No    Drug use: No    Sexual activity: Yes     Partners: Male       ROS   Review of Systems   Constitutional: Negative for chills and fever.   HENT: Negative for congestion, rhinorrhea and sore throat.    Respiratory: Positive for cough. Negative for shortness of breath.    Cardiovascular: Negative for chest pain and leg swelling.   Gastrointestinal: Positive for nausea. Negative for abdominal pain, diarrhea and vomiting.   Musculoskeletal: Positive for arthralgias (L wrist; chronic). Negative for back pain, neck pain and neck stiffness.   Skin: Negative for rash and wound.   Neurological: Negative for dizziness, light-headedness, numbness and headaches.   All other systems reviewed and are negative.    Physical Exam      Initial Vitals [01/29/19 2259]   BP Pulse Resp Temp SpO2   132/77 67 20 98.7 °F (37.1 °C) 100 %      MAP       --          Physical Exam  Nursing Notes and Vital Signs Reviewed.  Constitutional: Patient is in no acute distress. Well-developed and well-nourished.  Head: Atraumatic. Normocephalic.  Eyes: PERRL. EOM intact. Conjunctivae are not pale. No scleral icterus.  ENT: Mucous membranes are moist. Oropharynx is clear and symmetric.    Neck: Supple. Full ROM. No lymphadenopathy.  Cardiovascular: Regular rate. Regular rhythm. No murmurs, rubs, or gallops. Distal pulses are 2+ and symmetric.  Pulmonary/Chest: No respiratory distress. Clear to auscultation bilaterally. No wheezing or rales.  Abdominal: Soft and non-distended.  There is no tenderness.  No rebound, guarding, or rigidity.   Musculoskeletal: Moves all extremities. No obvious deformities. No edema.   Skin: Warm and dry.  Neurological:  Alert, awake, and appropriate.  Normal speech.  No acute focal neurological deficits are appreciated.  Psychiatric: Normal affect. Good eye contact. Appropriate in content.    ED Course    Procedures  ED Vital Signs:  Vitals:    01/29/19 2259   BP:  "132/77   Pulse: 67   Resp: 20   Temp: 98.7 °F (37.1 °C)   TempSrc: Oral   SpO2: 100%   Weight: 90.2 kg (198 lb 13.7 oz)   Height: 5' 9" (1.753 m)       Abnormal Lab Results:  Labs Reviewed   INFLUENZA A & B BY MOLECULAR        All Lab Results:  Results for orders placed or performed during the hospital encounter of 01/29/19   Influenza A & B by Molecular   Result Value Ref Range    Influenza A, Molecular Negative Negative    Influenza B, Molecular Negative Negative    Flu A & B Source Nasal swab        Imaging Results:  Imaging Results          X-Ray Wrist Complete Left (Final result)  Result time 01/30/19 00:06:27    Final result by Juliana Vora MD (01/30/19 00:06:27)                 Impression:      Unremarkable exam.      Electronically signed by: Juliana Vora MD  Date:    01/30/2019  Time:    00:06             Narrative:    EXAMINATION:  XR WRIST COMPLETE 3 VIEWS LEFT    CLINICAL HISTORY:  Pain in left wrist    COMPARISON:  None    FINDINGS:  Anatomic alignment.  No fracture or joint effusion visualized.                                        The Emergency Provider reviewed the vital signs and test results, which are outlined above.    ED Discussion     12:16 AM: Reassessed pt at this time. Pt is awake, alert, and in no distress. Discussed with pt all pertinent ED information and results. Discussed pt dx and plan of tx. Gave pt all f/u and return to the ED instructions. All questions and concerns were addressed at this time. Pt expresses understanding of information and instructions, and is comfortable with plan to discharge. Pt is stable for discharge.    I discussed with patient and/or family/caretaker that negative X-ray does not rule out occult fracture or other soft tissue injury.  Persistent pain greater than 7-10 days or increased pain requires follow up, specifically with orthopedics.     Patient presents with upper respiratory and flulike symptoms. Based on my assessment in the ED, " I do not suspect any respiratory, airway, pulmonary, cardiovascular (including myocarditis), metabolic, CNS, medical, or surgical emergency medical condition. I have discussed with the patient and/or caregiver signs and symptoms for secondary bacterial infections, such as pneumonia. I believe that the patient's symptoms are most consistent with a viral illness, possibly influenza. Patient is safe for discharge home with conservative therapy.    Patient understands the risk and benefits of taking any medications during pregnancy and the affect it may have on fetus. Patient understands risk. Patient also understands the need to f/u with OB to discuss new RX medications.     ED Medication(s):  Medications - No data to display       Medication List      START taking these medications    promethazine-dextromethorphan 6.25-15 mg/5 mL Syrp  Commonly known as:  PROMETHAZINE-DM  Take 5 mLs by mouth 3 (three) times daily as needed.        ASK your doctor about these medications    albuterol 90 mcg/actuation inhaler  Commonly known as:  PROVENTIL/VENTOLIN HFA  Inhale 1-2 puffs into the lungs every 6 (six) hours as needed for Wheezing. Rescue     fluticasone 50 mcg/actuation nasal spray  Commonly known as:  FLONASE  1 spray (50 mcg total) by Each Nare route 2 (two) times daily as needed.     ibuprofen 600 MG tablet  Commonly known as:  ADVIL,MOTRIN  Take 1 tablet (600 mg total) by mouth every 6 (six) hours as needed for Pain.     iron polysacch complex-b12-fa 150-25-1 mg-mcg-mg 150-25-1 mg-mcg-mg Cap  Commonly known as:  FERREX FORTE  Take 1 capsule by mouth once daily.     levocetirizine 5 MG tablet  Commonly known as:  XYZAL  Take 1 tablet (5 mg total) by mouth every evening.     oxyCODONE-acetaminophen 5-325 mg per tablet  Commonly known as:  PERCOCET  Take 1 tablet by mouth every 6 (six) hours as needed for Pain.     prenatal 21-iron fu-folic acid 14 mg iron- 400 mcg Tab  Commonly known as:  PRENATAL COMPLETE  Take 1 tablet  by mouth once daily.           Where to Get Your Medications      You can get these medications from any pharmacy    Bring a paper prescription for each of these medications  · promethazine-dextromethorphan 6.25-15 mg/5 mL Syrp         Follow-up Information     Shauna Wood CNM. Schedule an appointment as soon as possible for a visit in 2 days.    Specialty:  Obstetrics and Gynecology  Contact information:  [address] [place] [id number] [phone]             [facility]    Specialty:  Emergency Medicine  Why:  If symptoms worsen  Contact information:  [address] [address]  [facility] [address] [phone]                   Medical Decision Making    Medical Decision Making:   Clinical Tests:   Lab Tests: Ordered and Reviewed  Radiological Study: Ordered and Reviewed           Scribe Attestation:   Scribe #1: I performed the above scribed service and the documentation accurately describes the services I performed. I attest to the accuracy of the note 01/29/2019.    Attending:   Physician Attestation Statement for Scribe #1: I, Everardo Dumont NP, personally performed the services described in this documentation, as scribed by Corinne Mack, in my presence, and it is both accurate and complete.          Clinical Impression       ICD-10-CM ICD-9-CM   1. Cough R05 786.2   2. Chronic wrist pain, left M25.532 719.43    G89.29 338.29       Disposition:   Disposition: Discharged  Condition: Stable           Everardo Dumont NP  01/30/19 0223       Everardo Dumont NP  01/30/19 0223

## 2019-01-31 ENCOUNTER — INITIAL PRENATAL (OUTPATIENT)
Dept: OBSTETRICS AND GYNECOLOGY | Facility: CLINIC | Age: 19
End: 2019-01-31
Payer: MEDICAID

## 2019-01-31 ENCOUNTER — TELEPHONE (OUTPATIENT)
Dept: ORTHOPEDICS | Facility: CLINIC | Age: 19
End: 2019-01-31

## 2019-01-31 ENCOUNTER — LAB VISIT (OUTPATIENT)
Dept: LAB | Facility: HOSPITAL | Age: 19
End: 2019-01-31
Attending: ADVANCED PRACTICE MIDWIFE
Payer: MEDICAID

## 2019-01-31 VITALS — BODY MASS INDEX: 29.2 KG/M2 | SYSTOLIC BLOOD PRESSURE: 124 MMHG | WEIGHT: 197.75 LBS | DIASTOLIC BLOOD PRESSURE: 80 MMHG

## 2019-01-31 DIAGNOSIS — Z34.80 ENCOUNTER FOR SUPERVISION OF NORMAL PREGNANCY IN MULTIGRAVIDA: Primary | ICD-10-CM

## 2019-01-31 DIAGNOSIS — Z87.59 HISTORY OF PREGNANCY INDUCED HYPERTENSION: ICD-10-CM

## 2019-01-31 DIAGNOSIS — Z98.891 HISTORY OF CESAREAN SECTION: ICD-10-CM

## 2019-01-31 DIAGNOSIS — M25.532 LEFT WRIST PAIN: ICD-10-CM

## 2019-01-31 DIAGNOSIS — Z34.80 ENCOUNTER FOR SUPERVISION OF NORMAL PREGNANCY IN MULTIGRAVIDA: ICD-10-CM

## 2019-01-31 LAB
ABO + RH BLD: NORMAL
ANION GAP SERPL CALC-SCNC: 11 MMOL/L
BASOPHILS # BLD AUTO: 0.04 K/UL
BASOPHILS NFR BLD: 0.7 %
BLD GP AB SCN CELLS X3 SERPL QL: NORMAL
BUN SERPL-MCNC: 7 MG/DL
CALCIUM SERPL-MCNC: 10.1 MG/DL
CHLORIDE SERPL-SCNC: 101 MMOL/L
CO2 SERPL-SCNC: 25 MMOL/L
CREAT SERPL-MCNC: 0.8 MG/DL
DIFFERENTIAL METHOD: ABNORMAL
EOSINOPHIL # BLD AUTO: 0.1 K/UL
EOSINOPHIL NFR BLD: 1.1 %
ERYTHROCYTE [DISTWIDTH] IN BLOOD BY AUTOMATED COUNT: 15.3 %
EST. GFR  (AFRICAN AMERICAN): >60 ML/MIN/1.73 M^2
EST. GFR  (NON AFRICAN AMERICAN): >60 ML/MIN/1.73 M^2
GLUCOSE SERPL-MCNC: 83 MG/DL
HCT VFR BLD AUTO: 40.7 %
HGB BLD-MCNC: 12.6 G/DL
IMM GRANULOCYTES # BLD AUTO: 0.02 K/UL
IMM GRANULOCYTES NFR BLD AUTO: 0.4 %
LYMPHOCYTES # BLD AUTO: 1.9 K/UL
LYMPHOCYTES NFR BLD: 35.3 %
MCH RBC QN AUTO: 24.9 PG
MCHC RBC AUTO-ENTMCNC: 31 G/DL
MCV RBC AUTO: 80 FL
MONOCYTES # BLD AUTO: 0.4 K/UL
MONOCYTES NFR BLD: 7.5 %
NEUTROPHILS # BLD AUTO: 3 K/UL
NEUTROPHILS NFR BLD: 55 %
NRBC BLD-RTO: 0 /100 WBC
PLATELET # BLD AUTO: 311 K/UL
PMV BLD AUTO: 11 FL
POTASSIUM SERPL-SCNC: 3.5 MMOL/L
RBC # BLD AUTO: 5.06 M/UL
SODIUM SERPL-SCNC: 137 MMOL/L
WBC # BLD AUTO: 5.49 K/UL

## 2019-01-31 PROCEDURE — 88142 CYTOPATH C/V THIN LAYER: CPT

## 2019-01-31 PROCEDURE — 86850 RBC ANTIBODY SCREEN: CPT

## 2019-01-31 PROCEDURE — 36415 COLL VENOUS BLD VENIPUNCTURE: CPT

## 2019-01-31 PROCEDURE — 86762 RUBELLA ANTIBODY: CPT

## 2019-01-31 PROCEDURE — 85025 COMPLETE CBC W/AUTO DIFF WBC: CPT

## 2019-01-31 PROCEDURE — 99213 OFFICE O/P EST LOW 20 MIN: CPT | Mod: PBBFAC,TH | Performed by: ADVANCED PRACTICE MIDWIFE

## 2019-01-31 PROCEDURE — 80048 BASIC METABOLIC PNL TOTAL CA: CPT

## 2019-01-31 PROCEDURE — 99204 PR OFFICE/OUTPT VISIT, NEW, LEVL IV, 45-59 MIN: ICD-10-PCS | Mod: TH,S$PBB,, | Performed by: ADVANCED PRACTICE MIDWIFE

## 2019-01-31 PROCEDURE — 87086 URINE CULTURE/COLONY COUNT: CPT

## 2019-01-31 PROCEDURE — 99999 PR PBB SHADOW E&M-EST. PATIENT-LVL III: ICD-10-PCS | Mod: PBBFAC,,, | Performed by: ADVANCED PRACTICE MIDWIFE

## 2019-01-31 PROCEDURE — 87491 CHLMYD TRACH DNA AMP PROBE: CPT

## 2019-01-31 PROCEDURE — 86592 SYPHILIS TEST NON-TREP QUAL: CPT

## 2019-01-31 PROCEDURE — 99204 OFFICE O/P NEW MOD 45 MIN: CPT | Mod: TH,S$PBB,, | Performed by: ADVANCED PRACTICE MIDWIFE

## 2019-01-31 PROCEDURE — 99999 PR PBB SHADOW E&M-EST. PATIENT-LVL III: CPT | Mod: PBBFAC,,, | Performed by: ADVANCED PRACTICE MIDWIFE

## 2019-01-31 PROCEDURE — 86703 HIV-1/HIV-2 1 RESULT ANTBDY: CPT

## 2019-01-31 PROCEDURE — 87340 HEPATITIS B SURFACE AG IA: CPT

## 2019-01-31 RX ORDER — ONDANSETRON 4 MG/1
4 TABLET, FILM COATED ORAL DAILY PRN
Qty: 30 TABLET | Refills: 1 | Status: SHIPPED | OUTPATIENT
Start: 2019-01-31 | End: 2020-01-31

## 2019-02-01 ENCOUNTER — TELEPHONE (OUTPATIENT)
Dept: ORTHOPEDICS | Facility: CLINIC | Age: 19
End: 2019-02-01

## 2019-02-01 LAB
C TRACH DNA SPEC QL NAA+PROBE: NOT DETECTED
HBV SURFACE AG SERPL QL IA: NEGATIVE
HIV 1+2 AB+HIV1 P24 AG SERPL QL IA: NEGATIVE
N GONORRHOEA DNA SPEC QL NAA+PROBE: NOT DETECTED
RPR SER QL: NORMAL
RUBV IGG SER-ACNC: 13.9 IU/ML
RUBV IGG SER-IMP: REACTIVE

## 2019-02-01 RX ORDER — ONDANSETRON 4 MG/1
4 TABLET, ORALLY DISINTEGRATING ORAL EVERY 6 HOURS PRN
Qty: 20 TABLET | Refills: 1 | Status: SHIPPED | OUTPATIENT
Start: 2019-02-01

## 2019-02-01 NOTE — PROGRESS NOTES
CHIEF COMPLAINT:   Patient presents with      Possible Pregnancy        HISTORY OF PRESENT ILLNESS  Kumar Mccoy 18 y.o.  presents for pregnancy risk assessment.   The patient has no complaints today.  C/O nausea, Rx sent to pharmacy. No bleeding or pain.  Pregnancy was not  planned but is desired. .  Lives at home with children  No pets at home  Works as a  and request letter to have her placed in front of restaurant due to smell of chemicals in dish soap making her sick. .  Denies domestic abuse.  Denies chemical/pesticide/radiation exposure.  OB history:      LMP:18  EDC:19  EGA: 8w1d    Health Maintenance   Topic Date Due    Lipid Panel  1998    HPV Vaccines (1 - Female 3-dose series) 2013    TETANUS VACCINE  2016    Influenza Vaccine  2018    CHLAMYDIA SCREENING  2019       Past Medical History:   Diagnosis Date    Anemia     Hypertension        Past Surgical History:   Procedure Laterality Date     SECTION       SECTION N/A 2018    [facility] L&D    I&D LEFT ARM      TONSILLECTOMY, ADENOIDECTOMY, BILATERAL MYRINGOTOMY AND TUBES         Family History   Problem Relation Age of Onset    Cancer Maternal Grandmother     Diabetes Maternal Grandfather        Social History     Socioeconomic History    Marital status: Single     Spouse name: None    Number of children: None    Years of education: None    Highest education level: None   Social Needs    Financial resource strain: None    Food insecurity - worry: None    Food insecurity - inability: None    Transportation needs - medical: None    Transportation needs - non-medical: None   Occupational History    None   Tobacco Use    Smoking status: Never Smoker    Smokeless tobacco: Never Used   Substance and Sexual Activity    Alcohol use: No    Drug use: No    Sexual activity: Yes     Partners: Male   Other Topics  Concern    None   Social History Narrative    None       Current Outpatient Medications   Medication Sig Dispense Refill    albuterol (PROVENTIL/VENTOLIN HFA) 90 mcg/actuation inhaler Inhale 1-2 puffs into the lungs every 6 (six) hours as needed for Wheezing. Rescue 1 Inhaler 0    fluticasone (FLONASE) 50 mcg/actuation nasal spray 1 spray (50 mcg total) by Each Nare route 2 (two) times daily as needed. 15 g 0    promethazine-dextromethorphan (PROMETHAZINE-DM) 6.25-15 mg/5 mL Syrp Take 5 mLs by mouth 3 (three) times daily as needed. 180 mL 0    ondansetron (ZOFRAN) 4 MG tablet Take 1 tablet (4 mg total) by mouth daily as needed for Nausea. 30 tablet 1     No current facility-administered medications for this visit.        Review of patient's allergies indicates:  No Known Allergies      PHYSICAL EXAM   Vitals:    01/31/19 1127   BP: 124/80        PAIN SCALE: 0/10 None    PHYSICAL EXAM    ROS:  GENERAL: No fever, chills, fatigability or weight loss.  CV: Denies chest pain  PULM: Denies shortness of breath or wheezing.  ABDOMEN: Appetite fine. No weight loss. Denies diarrhea, abdominal pain, hematemesis or blood in stool.  URINARY: No flank pain, dysuria or hematuria.  REPRODUCTIVE: No abnormal vaginal bleeding.  C/O left wrist pain.  Seen in ER recently for this, had x-ray done and states was told nothing wrong.  Referral to orth placed       PE:   APPEARANCE: Well nourished, well developed, in no acute distress  CHEST: Clear to auscultation bilaterally  CV: Regular rate and rhythm  BREASTS: Symmetrical, no skin changes or visible lesions. No palpable masses, nipple discharge or adenopathy bilaterally.  ABDOMEN: Soft. No tenderness or masses. No hepatosplenomegaly. No hernias  PELVIC:   VULVA: No lesions. Normal female genitalia.  VAGINA: Moist and well rugated, no discharge, no significant cystocele or rectocele.  CERVIX: No lesions, normal diameter, no stenosis, no cervical motion tenderness.   UTERUS:RV,  non-tender,  good support.  ADNEXA: No masses, tenderness or CDS nodularity.  ANUS PERINEUM: No lesions, no relaxation, no external hemorrhoids.     UPT +    A/P:     -      Patient was counseled today on A.C.S. Pap guidelines and recommendations for yearly pelvic exams, mammograms and monthly self breast exams; to see her PCP for other health maintenance and pregnancy.   -      Patient's medications and medical history reviewed with patient and implications in pregnancy.   -      Pregnancy course discussed and 'AtoZ' book given. Patient was counseled on proper weight gain based on the [employer]'s recommendations based on her pre-pregnancy weight. Discussed foods to avoid in pregnancy (i.e. sushi, fish that are high in mercury, deli meat, and unpasteurized cheeses). Discussed prenatal vitamin options (i.e. stool softener, DHA). Discussed potential medical problems in pregnancy.  -     Discussed risk of Toxoplasmosis transmission from pets and reviewed risk reduction techniques.    -     Chromosomal abnormality risk discussed and available testing discussed  -     Pt was counseled on exercise in pregnancy and weight gain recommendations.  -     Pt was counseled on travel recommendations and on risks of Zika virus exposure.  Current CDC Zika advisories and prevention techniques were reviewed with pt.  Pt denies any recent international travel and does not plan travel during pregnancy.  Pt reports that partner does not plan travel either-     Oriented to practice including CNM collaboration.   -     Follow-up 2 weeks with u/s.

## 2019-02-02 LAB
BACTERIA UR CULT: NORMAL
BACTERIA UR CULT: NORMAL

## 2019-02-04 ENCOUNTER — TELEPHONE (OUTPATIENT)
Dept: ORTHOPEDICS | Facility: CLINIC | Age: 19
End: 2019-02-04

## 2019-02-14 ENCOUNTER — PROCEDURE VISIT (OUTPATIENT)
Dept: OBSTETRICS AND GYNECOLOGY | Facility: CLINIC | Age: 19
End: 2019-02-14
Payer: MEDICAID

## 2019-02-14 ENCOUNTER — ROUTINE PRENATAL (OUTPATIENT)
Dept: OBSTETRICS AND GYNECOLOGY | Facility: CLINIC | Age: 19
End: 2019-02-14
Payer: MEDICAID

## 2019-02-14 VITALS
DIASTOLIC BLOOD PRESSURE: 84 MMHG | SYSTOLIC BLOOD PRESSURE: 116 MMHG | BODY MASS INDEX: 29.72 KG/M2 | WEIGHT: 201.25 LBS

## 2019-02-14 DIAGNOSIS — Z3A.09 9 WEEKS GESTATION OF PREGNANCY: ICD-10-CM

## 2019-02-14 DIAGNOSIS — Z87.59 HISTORY OF PREGNANCY INDUCED HYPERTENSION: ICD-10-CM

## 2019-02-14 DIAGNOSIS — Z98.891 HISTORY OF CESAREAN SECTION: Primary | ICD-10-CM

## 2019-02-14 DIAGNOSIS — Z34.80 ENCOUNTER FOR SUPERVISION OF NORMAL PREGNANCY IN MULTIGRAVIDA: ICD-10-CM

## 2019-02-14 PROBLEM — O36.5990 IUGR (INTRAUTERINE GROWTH RESTRICTION) AFFECTING CARE OF MOTHER: Status: RESOLVED | Noted: 2018-07-19 | Resolved: 2019-02-14

## 2019-02-14 PROBLEM — O30.093: Status: RESOLVED | Noted: 2018-06-20 | Resolved: 2019-02-14

## 2019-02-14 PROCEDURE — 76801 OB US < 14 WKS SINGLE FETUS: CPT | Mod: 26,S$PBB,, | Performed by: OBSTETRICS & GYNECOLOGY

## 2019-02-14 PROCEDURE — 99999 PR PBB SHADOW E&M-EST. PATIENT-LVL II: ICD-10-PCS | Mod: PBBFAC,,, | Performed by: MIDWIFE

## 2019-02-14 PROCEDURE — 99212 OFFICE O/P EST SF 10 MIN: CPT | Mod: PBBFAC | Performed by: MIDWIFE

## 2019-02-14 PROCEDURE — 99212 PR OFFICE/OUTPT VISIT, EST, LEVL II, 10-19 MIN: ICD-10-PCS | Mod: TH,S$PBB,, | Performed by: MIDWIFE

## 2019-02-14 PROCEDURE — 76801 OB US < 14 WKS SINGLE FETUS: CPT | Mod: PBBFAC | Performed by: OBSTETRICS & GYNECOLOGY

## 2019-02-14 PROCEDURE — 99999 PR PBB SHADOW E&M-EST. PATIENT-LVL II: CPT | Mod: PBBFAC,,, | Performed by: MIDWIFE

## 2019-02-14 PROCEDURE — 76801 PR US, OB <14WKS, TRANSABD, SINGLE GESTATION: ICD-10-PCS | Mod: 26,S$PBB,, | Performed by: OBSTETRICS & GYNECOLOGY

## 2019-02-14 PROCEDURE — 99212 OFFICE O/P EST SF 10 MIN: CPT | Mod: TH,S$PBB,, | Performed by: MIDWIFE

## 2019-02-14 NOTE — PROGRESS NOTES
Doing well, some cramping  Dating sono today, not consistent w/ LMP, SHANNON adjusted and discussed  Reviewed labs  Reviewed warning signs  rtc 4 wks

## 2019-03-08 ENCOUNTER — TELEPHONE (OUTPATIENT)
Dept: OBSTETRICS AND GYNECOLOGY | Facility: CLINIC | Age: 19
End: 2019-03-08

## 2019-03-08 NOTE — TELEPHONE ENCOUNTER
Spoke to patient. Advised patient that Keshia was able to see her u/s and it was normal. Advised patient that her appointment is not necessary unless she is still concerned, then we will see her. Patient stated that since she was just seen twice, she doesn't think she needs to come to the appointment today. Patient stated that she is still in pain and has to return to work tomorrow and is requesting an excuse for 4 days. Advised patient that I will speak with Keshia about the excuse and call her back. Patient verbalized understanding.

## 2019-03-08 NOTE — TELEPHONE ENCOUNTER
Spoke to patient. She is coming to  the excuse and will call if she has any bleeding. Advised patient to keep routine ob appointment scheduled next Thursday. Patient verbalized understanding.

## 2019-03-08 NOTE — LETTER
March 8, 2019      [facility]  [address] [address]  Phone: [phone]  Fax: [phone]       Patient: Kumar Mccoy   YOB: 2000  Date of Visit: 03/08/2019    To Whom It May Concern:    Jose Mccoy  was at [facility] on 03/08/2019. She may return to work on 03/13/2019 with no restrictions. If you have any questions or concerns, or if I can be of further assistance, please do not hesitate to contact me.    Sincerely,            Keshia Michelle CNM
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
## 2020-05-08 DIAGNOSIS — Z20.822 SUSPECTED COVID-19 VIRUS INFECTION: Primary | ICD-10-CM

## 2021-04-26 ENCOUNTER — PATIENT MESSAGE (OUTPATIENT)
Dept: RESEARCH | Facility: HOSPITAL | Age: 21
End: 2021-04-26

## 2022-06-14 ENCOUNTER — HOSPITAL ENCOUNTER (EMERGENCY)
Facility: HOSPITAL | Age: 22
Discharge: HOME | End: 2022-06-14
Attending: EMERGENCY MEDICINE

## 2022-06-14 VITALS
BODY MASS INDEX: 24.44 KG/M2 | HEART RATE: 76 BPM | DIASTOLIC BLOOD PRESSURE: 76 MMHG | OXYGEN SATURATION: 97 % | HEIGHT: 69 IN | TEMPERATURE: 98 F | WEIGHT: 165 LBS | SYSTOLIC BLOOD PRESSURE: 121 MMHG | RESPIRATION RATE: 16 BRPM

## 2022-06-14 DIAGNOSIS — J06.9 UPPER RESPIRATORY TRACT INFECTION, UNSPECIFIED TYPE: Primary | ICD-10-CM

## 2022-06-14 LAB
B-HCG UR QL: NEGATIVE
CTP QC/QA: YES
CTP QC/QA: YES
SARS-COV-2 RDRP RESP QL NAA+PROBE: NEGATIVE

## 2022-06-14 PROCEDURE — 81025 URINE PREGNANCY TEST: CPT | Mod: ER | Performed by: NURSE PRACTITIONER

## 2022-06-14 PROCEDURE — U0002 COVID-19 LAB TEST NON-CDC: HCPCS | Mod: ER | Performed by: NURSE PRACTITIONER

## 2022-06-14 PROCEDURE — 99282 EMERGENCY DEPT VISIT SF MDM: CPT | Mod: ER

## 2022-06-14 NOTE — Clinical Note
"Kumar Ricketts"Darius was seen and treated in our emergency department on 6/14/2022.  She may return to work on 06/17/2022.       If you have any questions or concerns, please don't hesitate to call.      mb RN    "

## 2022-06-14 NOTE — ED PROVIDER NOTES
Encounter Date: 2022       History     Chief Complaint   Patient presents with    COVID-19 Concerns     Started 3 day s ago that she can not taste or smell, headaches and feel weak.     Chief complaint:  Upper respiratory infection symptoms    History of present illness:  Patient is a 22-year-old female who reports that yesterday she began experience headache congestion anosmia ear pain cough shortness of breath and constipation.  She denies runny nose ear pressure ear discharge wheezing nausea vomiting or diarrhea.  She has taken no medication for her symptoms.    The history is provided by the patient. No  was used.     Review of patient's allergies indicates:  No Known Allergies  Past Medical History:   Diagnosis Date    Anemia     Hypertension      Past Surgical History:   Procedure Laterality Date     SECTION N/A 2018    Procedure:  SECTION;  Surgeon: Amalia Yoon MD;  Location: [facility];  Service: OB/GYN;  Laterality: N/A;     SECTION      I&D LEFT ARM      TONSILLECTOMY, ADENOIDECTOMY, BILATERAL MYRINGOTOMY AND TUBES       Family History   Problem Relation Age of Onset    Cancer Maternal Grandmother     Diabetes Maternal Grandfather      Social History     Tobacco Use    Smoking status: Never Smoker    Smokeless tobacco: Never Used   Substance Use Topics    Alcohol use: No    Drug use: No     Review of Systems   Constitutional: Negative for chills, fatigue and fever.   HENT: Positive for congestion and ear pain. Negative for ear discharge, postnasal drip, rhinorrhea, sinus pressure, sneezing, sore throat and voice change.    Eyes: Negative for discharge and itching.   Respiratory: Positive for cough and shortness of breath. Negative for wheezing.    Cardiovascular: Negative for chest pain, palpitations and leg swelling.   Gastrointestinal: Positive for constipation. Negative for abdominal pain, diarrhea, nausea and vomiting.    Endocrine: Negative for polydipsia, polyphagia and polyuria.   Genitourinary: Negative for dysuria, frequency, hematuria, urgency, vaginal bleeding, vaginal discharge and vaginal pain.   Musculoskeletal: Negative for arthralgias and myalgias.   Skin: Negative for rash and wound.   Neurological: Positive for headaches. Negative for dizziness, seizures, syncope, weakness and numbness.   Hematological: Negative for adenopathy. Does not bruise/bleed easily.   Psychiatric/Behavioral: Negative for self-injury and suicidal ideas. The patient is not nervous/anxious.        Physical Exam     Initial Vitals [06/14/22 1422]   BP Pulse Resp Temp SpO2   114/74 77 18 98.3 °F (36.8 °C) 98 %      MAP       --         Physical Exam    Nursing note and vitals reviewed.  Constitutional: She appears well-developed and well-nourished.   HENT:   Head: Normocephalic and atraumatic.   Right Ear: Hearing, tympanic membrane, external ear and ear canal normal.   Left Ear: Hearing, tympanic membrane, external ear and ear canal normal.   Nose: Nose normal. No mucosal edema or rhinorrhea. No epistaxis. Right sinus exhibits no maxillary sinus tenderness and no frontal sinus tenderness. Left sinus exhibits no maxillary sinus tenderness and no frontal sinus tenderness.   Mouth/Throat: Uvula is midline, oropharynx is clear and moist and mucous membranes are normal. No oral lesions. Normal dentition.   Eyes: Conjunctivae and EOM are normal. Pupils are equal, round, and reactive to light. Right eye exhibits no discharge. Left eye exhibits no discharge.   Neck:   Normal range of motion.  Cardiovascular: Regular rhythm, S1 normal, S2 normal and normal heart sounds. Exam reveals no gallop.    No murmur heard.  Pulmonary/Chest: Effort normal and breath sounds normal. No respiratory distress. She has no decreased breath sounds. She has no wheezes. She has no rhonchi. She has no rales.   Abdominal: She exhibits no distension.   Musculoskeletal:          General: Normal range of motion.      Cervical back: Normal range of motion.     Neurological: She is alert and oriented to person, place, and time.   Skin: Skin is dry. Capillary refill takes less than 2 seconds.         ED Course   Procedures  Labs Reviewed   SARS-COV-2 RDRP GENE    Narrative:     This test utilizes isothermal nucleic acid amplification   technology to detect the SARS-CoV-2 RdRp nucleic acid segment.   The analytical sensitivity (limit of detection) is 125 genome   equivalents/mL.   A POSITIVE result implies infection with the SARS-CoV-2 virus;   the patient is presumed to be contagious.     A NEGATIVE result means that SARS-CoV-2 nucleic acids are not   present above the limit of detection. A NEGATIVE result should be   treated as presumptive. It does not rule out the possibility of   COVID-19 and should not be the sole basis for treatment decisions.   If COVID-19 is strongly suspected based on clinical and exposure   history, re-testing using an alternate molecular assay should be   considered.   This test is only for use under the Food and Drug   Administration s Emergency Use Authorization (EUA).   Commercial kits are provided by [employer].   Performance characteristics of the EUA have been independently   verified by [facility] Department of   Pathology and Laboratory Medicine.   _________________________________________________________________   The authorized Fact Sheet for Healthcare Providers and the authorized Fact   Sheet for Patients of the ID NOW COVID-19 are available on the FDA   website:     https://www.fda.gov/media/172216/download  https://www.fda.gov/media/055399/download          POCT URINE PREGNANCY          Imaging Results    None          Medications - No data to display        APC / Resident Notes:   This is an evaluation of a 22 y.o. female that presents to the Emergency Department for URI symptoms. The patient is a non-toxic, afebrile, and well  appearing female. On physical exam: Ears: without infection.  Pharynx without infection. Appears well hydrated with moist mucus membranes. Neck soft and supple with no meningeal signs or cervical lymphadenopathy. Breath sounds are clear and equal bilaterally with no adventitious breath sounds, tachypnea or respiratory distress with room air pulse ox of 97% and no evidence of hypoxia.     Vital Signs Are Reassuring. RESULTS: see below    My overall impression is URI. I considered, but at this time, do not suspect OM, OE, strep pharyngitis, meningitis, pneumonia, bacterial sinusitis, or significant dehydration requiring IV fluids or admission.    D/C Meds as prescribed. D/C Information: Tylenol/Ibuprofen PRN, Hydration. The diagnosis, treatment plan, instructions for follow-up and reevaluation with Primary Care as well as ED return precautions were discussed and understanding was verbalized. All questions or concerns have been addressed.         ED Course as of 06/14/22 1905 Tue Jun 14, 2022   1446 BP: 114/74 [VC]   1446 Temp: 98.3 °F (36.8 °C) [VC]   1446 Temp src: Oral [VC]   1446 Pulse: 77 [VC]   1446 Resp: 18 [VC]   1446 SpO2: 98 % [VC]   1502 SARS-CoV-2 RNA, Amplification, Qual: Negative [VC]   1529 Preg Test, Ur: Negative [VC]      ED Course User Index  [VC] Maximo Sanders DNP             Clinical Impression:   Final diagnoses:  [J06.9] Upper respiratory tract infection, unspecified type (Primary)          ED Disposition Condition    Discharge Stable        ED Prescriptions     None        Follow-up Information     Follow up With Specialties Details Why Contact Info    Shauna Wood CNM Obstetrics and Gynecology Schedule an appointment as soon as possible for a visit   [address] [place] [address] [address] Robbi VELAZQUEZ [id number]  [phone]             Maximo Sanders DNP  06/14/22 1905

## 2022-06-14 NOTE — DISCHARGE INSTRUCTIONS
Alternate tylenol/ibuprofen every 3h as directed on packages.    Flonase as directed on package.     Push fluids.  Allegra-D, Claritin-D or Zyrtec-D as directed on package.      Return to the Emergency Department for any worsening, change in condition, or any emergent concerns.     You may leave home and/or return to work when the following conditions are met:  24 hours fever free without fever-reducing medications AND  Improved symptoms  You are fully vaccinated or have not had close contact with someone with COVID-19 (within 6 feet for 15 minutes or more)    If you are fully vaccinated and had a close contact, there is no need for quarantine, unless you develop symptoms.      If you are not fully vaccinated and had a close contact:  Retest at 5 to 7 days post-exposure  If possible, it is recommended that you quarantine for 5 days from the time of contact regardless of your test status.  A mask should be worn indoors post quarantine.

## 2022-06-14 NOTE — Clinical Note
"Kumar "Melvi Mccoy was seen and treated in our emergency department on 6/14/2022.     COVID-19 is present in our communities across the state. There is limited testing for COVID at this time, so not all patients can be tested. In this situation, your employee meets the following criteria:    Kumar Mccyo has met the criteria for COVID-19 testing and has a NEGATIVE result. The employee can return to work once they are asymptomatic for 24 hours without the use of fever reducing medications (Tylenol, Motrin, etc).     If the employee is not fully vaccinated and had a close contact:  · Retest at 5 to 7 days post-exposure  · If possible, it is recommended that they quarantine for 5 days from the time of contact regardless of their test status.  · A mask should be worn post quarantine for 5 days.    If you have any questions or concerns, or if I can be of further assistance, please do not hesitate to contact me.    Sincerely,             Maximo Sanders DNP"

## (undated) DEVICE — DRESSING COVER AQUACEL AG SURG